# Patient Record
Sex: FEMALE | Race: WHITE
[De-identification: names, ages, dates, MRNs, and addresses within clinical notes are randomized per-mention and may not be internally consistent; named-entity substitution may affect disease eponyms.]

---

## 2020-02-06 ENCOUNTER — HOSPITAL ENCOUNTER (OUTPATIENT)
Dept: HOSPITAL 26 - MED | Age: 60
Setting detail: OBSERVATION
LOS: 1 days | Discharge: HOME | End: 2020-02-07
Attending: INTERNAL MEDICINE | Admitting: INTERNAL MEDICINE
Payer: COMMERCIAL

## 2020-02-06 VITALS — DIASTOLIC BLOOD PRESSURE: 80 MMHG | SYSTOLIC BLOOD PRESSURE: 159 MMHG

## 2020-02-06 VITALS — BODY MASS INDEX: 48.82 KG/M2 | HEIGHT: 65 IN | WEIGHT: 293 LBS

## 2020-02-06 VITALS — DIASTOLIC BLOOD PRESSURE: 94 MMHG | SYSTOLIC BLOOD PRESSURE: 157 MMHG

## 2020-02-06 VITALS — DIASTOLIC BLOOD PRESSURE: 76 MMHG | SYSTOLIC BLOOD PRESSURE: 148 MMHG

## 2020-02-06 DIAGNOSIS — Z79.899: ICD-10-CM

## 2020-02-06 DIAGNOSIS — E66.9: ICD-10-CM

## 2020-02-06 DIAGNOSIS — E03.9: ICD-10-CM

## 2020-02-06 DIAGNOSIS — F17.210: ICD-10-CM

## 2020-02-06 DIAGNOSIS — E11.9: ICD-10-CM

## 2020-02-06 DIAGNOSIS — F41.9: ICD-10-CM

## 2020-02-06 DIAGNOSIS — J44.1: Primary | ICD-10-CM

## 2020-02-06 DIAGNOSIS — F32.9: ICD-10-CM

## 2020-02-06 DIAGNOSIS — Z88.0: ICD-10-CM

## 2020-02-06 DIAGNOSIS — I10: ICD-10-CM

## 2020-02-06 LAB
ALBUMIN FLD-MCNC: 3.2 G/DL (ref 3.4–5)
ANION GAP SERPL CALCULATED.3IONS-SCNC: 11.2 MMOL/L (ref 8–16)
AST SERPL-CCNC: 30 U/L (ref 15–37)
BASOPHILS # BLD AUTO: 0.1 K/UL (ref 0–0.22)
BASOPHILS NFR BLD AUTO: 1.2 % (ref 0–2)
BILIRUB SERPL-MCNC: 0.4 MG/DL (ref 0–1)
BUN SERPL-MCNC: 7 MG/DL (ref 7–18)
CHLORIDE SERPL-SCNC: 105 MMOL/L (ref 98–107)
CO2 SERPL-SCNC: 27.8 MMOL/L (ref 21–32)
CREAT SERPL-MCNC: 0.7 MG/DL (ref 0.6–1.3)
EOSINOPHIL # BLD AUTO: 0.1 K/UL (ref 0–0.4)
EOSINOPHIL NFR BLD AUTO: 1.6 % (ref 0–4)
ERYTHROCYTE [DISTWIDTH] IN BLOOD BY AUTOMATED COUNT: 14.6 % (ref 11.6–13.7)
GFR SERPL CREATININE-BSD FRML MDRD: 111 ML/MIN (ref 90–?)
GLUCOSE SERPL-MCNC: 123 MG/DL (ref 74–106)
HCT VFR BLD AUTO: 44 % (ref 36–48)
HGB BLD-MCNC: 14.5 G/DL (ref 12–16)
LYMPHOCYTES # BLD AUTO: 2.3 K/UL (ref 2.5–16.5)
LYMPHOCYTES NFR BLD AUTO: 29.3 % (ref 20.5–51.1)
MCH RBC QN AUTO: 29 PG (ref 27–31)
MCHC RBC AUTO-ENTMCNC: 33 G/DL (ref 33–37)
MCV RBC AUTO: 87.3 FL (ref 80–94)
MONOCYTES # BLD AUTO: 0.8 K/UL (ref 0.8–1)
MONOCYTES NFR BLD AUTO: 10.7 % (ref 1.7–9.3)
NEUTROPHILS # BLD AUTO: 4.4 K/UL (ref 1.8–7.7)
NEUTROPHILS NFR BLD AUTO: 57.2 % (ref 42.2–75.2)
PLATELET # BLD AUTO: 220 K/UL (ref 140–450)
POTASSIUM SERPL-SCNC: 4 MMOL/L (ref 3.5–5.1)
RBC # BLD AUTO: 5.04 MIL/UL (ref 4.2–5.4)
RSV AG SPEC QL IA: NEGATIVE
SODIUM SERPL-SCNC: 140 MMOL/L (ref 136–145)
WBC # BLD AUTO: 7.7 K/UL (ref 4.8–10.8)

## 2020-02-06 PROCEDURE — 96372 THER/PROPH/DIAG INJ SC/IM: CPT

## 2020-02-06 PROCEDURE — 87040 BLOOD CULTURE FOR BACTERIA: CPT

## 2020-02-06 PROCEDURE — 71045 X-RAY EXAM CHEST 1 VIEW: CPT

## 2020-02-06 PROCEDURE — 96365 THER/PROPH/DIAG IV INF INIT: CPT

## 2020-02-06 PROCEDURE — 87081 CULTURE SCREEN ONLY: CPT

## 2020-02-06 PROCEDURE — 82948 REAGENT STRIP/BLOOD GLUCOSE: CPT

## 2020-02-06 PROCEDURE — 83605 ASSAY OF LACTIC ACID: CPT

## 2020-02-06 PROCEDURE — 94640 AIRWAY INHALATION TREATMENT: CPT

## 2020-02-06 PROCEDURE — 94760 N-INVAS EAR/PLS OXIMETRY 1: CPT

## 2020-02-06 PROCEDURE — 80053 COMPREHEN METABOLIC PANEL: CPT

## 2020-02-06 PROCEDURE — 99285 EMERGENCY DEPT VISIT HI MDM: CPT

## 2020-02-06 PROCEDURE — 96376 TX/PRO/DX INJ SAME DRUG ADON: CPT

## 2020-02-06 PROCEDURE — 36415 COLL VENOUS BLD VENIPUNCTURE: CPT

## 2020-02-06 PROCEDURE — 96375 TX/PRO/DX INJ NEW DRUG ADDON: CPT

## 2020-02-06 PROCEDURE — G0378 HOSPITAL OBSERVATION PER HR: HCPCS

## 2020-02-06 PROCEDURE — 85025 COMPLETE CBC W/AUTO DIFF WBC: CPT

## 2020-02-06 PROCEDURE — 84484 ASSAY OF TROPONIN QUANT: CPT

## 2020-02-06 PROCEDURE — 80048 BASIC METABOLIC PNL TOTAL CA: CPT

## 2020-02-06 PROCEDURE — 87804 INFLUENZA ASSAY W/OPTIC: CPT

## 2020-02-06 PROCEDURE — 87420 RESP SYNCYTIAL VIRUS AG IA: CPT

## 2020-02-06 RX ADMIN — WATER SCH MG: 1 INJECTION INTRAMUSCULAR; INTRAVENOUS; SUBCUTANEOUS at 20:32

## 2020-02-06 RX ADMIN — ACETAMINOPHEN PRN MG: 325 TABLET ORAL at 18:01

## 2020-02-06 RX ADMIN — SODIUM CHLORIDE SCH MLS/HR: 9 INJECTION, SOLUTION INTRAVENOUS at 16:53

## 2020-02-06 RX ADMIN — SODIUM CHLORIDE PRN MG: 9 INJECTION, SOLUTION INTRAVENOUS at 20:47

## 2020-02-06 RX ADMIN — INSULIN LISPRO PRN UNITS: 100 INJECTION, SOLUTION INTRAVENOUS; SUBCUTANEOUS at 22:10

## 2020-02-06 NOTE — NUR
Patient will be admitted to care of DR KEENAN. Admited to MED SURG.  Will go to 
[id number]. Belongings list completed.  Report to BARBI YU.

## 2020-02-06 NOTE — NUR
PT WAS NOTED COUGHING AND RT WAS CALLED FOR PRN NEB TREATMENT. CALLED ON CALL MD NAGEL FOR 
REQUEST OF SLEEPING PILL. HE SAID YES AMBIEN 5MG. MD  DECLINED TO GIVEN PRN COUGH 
MEDICATION.

## 2020-02-06 NOTE — NUR
PATIENT IS SATURATING 90% ROOM AIR AT THIS TIME. NO SIGNS OF DISTRESS NOTED. NO PAIN. NO 
CHEST PAIN.

## 2020-02-06 NOTE — NUR
PT GIVEN ORDERED SOLUMEDROL; EDUCATION REGARDING MEDICATION PURPOSE AND SIDE EFFECTS 
EXPLAINED AT BEDSIDE. PT WAS ASKING ABOUT IF SHE IS ABLE TO CONTINUE WITH HER HOME 
MEDICATIONS. PT FINGERSTICK . WILL CALL ON CALL MD TO RECONCILE HOME MEDS.

## 2020-02-06 NOTE — NUR
GIVEN TYLENOL FOR MILD PAIN. EXPLAINED TO PATIENT MED. PATIENT VERBALIZED UNDERSTANDING. BED 
IN LOW POSITION. CALL LIGHT IS WITHIN REACH. WILL CONTINUE TO MONITOR

## 2020-02-06 NOTE — NUR
PATIENT IS AWAKE, LAYING IN BED. NO SIGNS OF DISTRESS. NO SOB. NO CHEST PAIN OR ANY 
DISCOMFORT. BED IN LOW POSITION. CALL LIGHT IS WITHIN REACH. WILL CONTINUE TO MONITOR

## 2020-02-06 NOTE — NUR
RECEIVED PATIENT FROM ED NURSE LINH VIA DEQUAN. AAOX4. RESPIRATIONS EVEN AND LABORED, RESP 
26. PATIENT IS DIAGNOSED WITH COPD. PATIENT HAS INTERMITTENT NON-PRODUCTIVE COUGH. NO FEVER. 
NO PAIN. ON ROOM AIR. AS PER ED NURSE, KEEP PATIENT ON ROOM AIR TO SEE HOW WELL PATIENT IS 
TOLERATING IT. SKIN WARM, DRY, AND INTACT. IV IN THE RIGHT AC G20 RUNNING AZITHROMYCIN AT 
150 ML/HR. ABDOMEN ROUND AND NON-TENDER. LAST BM WAS 2/2/2020. LAST MENSTRUAL PERIOD WAS 5 
YEARS AGO. PATIENT AMBULATED TO THE ROOM. BED IN LOW POSITION. CALL LIGHT IS WITHIN REACH. 
WILL CONTINUE TO MONITOR.

## 2020-02-06 NOTE — NUR
ENDORSED PATIENT TO NIGHT SHIFT NURSE FOR CONTINUITY OF CARE. NO SOB. DENIES ANY OTHER 
DISCOMFORT. PATIENT IS IN STABLE CONDITION

## 2020-02-06 NOTE — NUR
HOME MEDS RECONCILED WITH ON CALL MD NAGEL. HE DIDN'T WANT TO CONTINUE PT ON METFORMIN, 
HOWEVER HE DID ORDERED PROTOCOL F/S AND INSULIN S/S. PT WAS GIVEN 4 UNITS OF HUMALOG 
COVERAGE. MD NAGEL ALSO SAID OK TO REQUESTED PSYCH  MEDS OF SEROQUEL, ZOLOFT, ATIVAN AND 
LATUDA AS ORDERED FOR HOME MEDS.MD DID NOT WANT TO CONTINUE WITH HYDROCHLOROTHIAZIDE AT THIS 
TIME.

## 2020-02-06 NOTE — NUR
HHN THERAPY AND RESPIRATORY DRUG GIVEN AS ORDERED ENCOURAGED PATIENT FOR 
INTERMITTENT DEEP BREATHING DURING THERAPY

## 2020-02-06 NOTE — NUR
PT SITTING UPRIGHT CALM AND PLEASANT. STATES EASIER WORK OF BREATHING WITH 
OXYGEN. WILL CONTINUE TO MONITOR

## 2020-02-06 NOTE — NUR
HANG NS AT 50 ML/HR. AZITHROMYCIN IS RUNNING  ML/HR AS PER ED NURSE

-------------------------------------------------------------------------------

Addendum: 02/06/20 at 1831 by Princess Vivien Renee RN

-------------------------------------------------------------------------------

NS AT 70 ML/HR

## 2020-02-06 NOTE — NUR
PT IN BED AOX 3-4, WITH SKIN INTACT. IV SITE INTACT RAC 22G RUNNING NORMAL SALINE AT 
70MLS/HR. PT HAS 2 LITER S VIA N/C. RESPIRATIONS SOMEWHAT LABORED AND SHE IS NOTED TO HAVE A 
DRY NON  PRODUCTIVE  INTERMITTED COUGH. V/S AS FOLLOWS: T 97.0 P 77 R 20 B/P 159/80 O2 93% 
WITH 2 LITERS VIA N/C. ALL UNIVERSAL FALLS PRECAUTIONS IN PLACE.

## 2020-02-06 NOTE — NUR
RECEIVED REPORT FORM GIGI RN DAYSHIFT NURSE AT BEDSIDE FOR CONTINUITY OF CARE, PT IN 
STABLE CONDITION.

## 2020-02-06 NOTE — NUR
SWAB FOR MRSA NARES COLLECTED. VITAL SIGNS TAKEN. BP /76, HR IS 78, O2SAT IS 90% ROOM 
AIR. TEMP IS 97.8. RESPIRATIONS IS 26, LABORED.

## 2020-02-06 NOTE — NUR
FOLLOW UP HHN THERAPY AND RESPIRATORY DRUG GIVEN AS NEEDED ENCOURAGED PATIENT 
FOR INTERMITTENT DEEP BREATHING DURING THERAPY

## 2020-02-07 VITALS — SYSTOLIC BLOOD PRESSURE: 125 MMHG | DIASTOLIC BLOOD PRESSURE: 90 MMHG

## 2020-02-07 VITALS — DIASTOLIC BLOOD PRESSURE: 74 MMHG | SYSTOLIC BLOOD PRESSURE: 160 MMHG

## 2020-02-07 LAB
ANION GAP SERPL CALCULATED.3IONS-SCNC: 11 MMOL/L (ref 8–16)
BASOPHILS # BLD AUTO: 0 K/UL (ref 0–0.22)
BASOPHILS NFR BLD AUTO: 0.4 % (ref 0–2)
BUN SERPL-MCNC: 9 MG/DL (ref 7–18)
CHLORIDE SERPL-SCNC: 103 MMOL/L (ref 98–107)
CO2 SERPL-SCNC: 27.3 MMOL/L (ref 21–32)
CREAT SERPL-MCNC: 0.7 MG/DL (ref 0.6–1.3)
EOSINOPHIL # BLD AUTO: 0.1 K/UL (ref 0–0.4)
EOSINOPHIL NFR BLD AUTO: 0.8 % (ref 0–4)
ERYTHROCYTE [DISTWIDTH] IN BLOOD BY AUTOMATED COUNT: 14.5 % (ref 11.6–13.7)
GFR SERPL CREATININE-BSD FRML MDRD: 111 ML/MIN (ref 90–?)
GLUCOSE SERPL-MCNC: 202 MG/DL (ref 74–106)
HCT VFR BLD AUTO: 42.7 % (ref 36–48)
HGB BLD-MCNC: 14.5 G/DL (ref 12–16)
LYMPHOCYTES # BLD AUTO: 1.1 K/UL (ref 2.5–16.5)
LYMPHOCYTES NFR BLD AUTO: 10.5 % (ref 20.5–51.1)
MCH RBC QN AUTO: 30 PG (ref 27–31)
MCHC RBC AUTO-ENTMCNC: 34 G/DL (ref 33–37)
MCV RBC AUTO: 87.3 FL (ref 80–94)
MONOCYTES # BLD AUTO: 0.2 K/UL (ref 0.8–1)
MONOCYTES NFR BLD AUTO: 2.3 % (ref 1.7–9.3)
NEUTROPHILS # BLD AUTO: 9.2 K/UL (ref 1.8–7.7)
NEUTROPHILS NFR BLD AUTO: 86 % (ref 42.2–75.2)
PLATELET # BLD AUTO: 225 K/UL (ref 140–450)
POTASSIUM SERPL-SCNC: 4.3 MMOL/L (ref 3.5–5.1)
RBC # BLD AUTO: 4.89 MIL/UL (ref 4.2–5.4)
SODIUM SERPL-SCNC: 137 MMOL/L (ref 136–145)
WBC # BLD AUTO: 10.6 K/UL (ref 4.8–10.8)

## 2020-02-07 RX ADMIN — WATER SCH MG: 1 INJECTION INTRAMUSCULAR; INTRAVENOUS; SUBCUTANEOUS at 13:04

## 2020-02-07 RX ADMIN — SODIUM CHLORIDE PRN MG: 9 INJECTION, SOLUTION INTRAVENOUS at 11:59

## 2020-02-07 RX ADMIN — Medication SCH DEV: at 06:07

## 2020-02-07 RX ADMIN — INSULIN LISPRO PRN UNITS: 100 INJECTION, SOLUTION INTRAVENOUS; SUBCUTANEOUS at 11:58

## 2020-02-07 RX ADMIN — ALBUTEROL SULFATE PRN MG: 2.5 SOLUTION RESPIRATORY (INHALATION) at 05:22

## 2020-02-07 RX ADMIN — IPRATROPIUM BROMIDE PRN MG: 0.5 SOLUTION RESPIRATORY (INHALATION) at 00:05

## 2020-02-07 RX ADMIN — ACETAMINOPHEN PRN MG: 325 TABLET ORAL at 05:09

## 2020-02-07 RX ADMIN — ACETAMINOPHEN PRN MG: 325 TABLET ORAL at 08:49

## 2020-02-07 RX ADMIN — IPRATROPIUM BROMIDE PRN MG: 0.5 SOLUTION RESPIRATORY (INHALATION) at 05:22

## 2020-02-07 RX ADMIN — SODIUM CHLORIDE SCH MLS/HR: 9 INJECTION, SOLUTION INTRAVENOUS at 06:13

## 2020-02-07 RX ADMIN — INSULIN LISPRO PRN UNITS: 100 INJECTION, SOLUTION INTRAVENOUS; SUBCUTANEOUS at 06:10

## 2020-02-07 RX ADMIN — Medication SCH DEV: at 11:57

## 2020-02-07 RX ADMIN — SODIUM CHLORIDE PRN MG: 9 INJECTION, SOLUTION INTRAVENOUS at 05:06

## 2020-02-07 RX ADMIN — WATER SCH MG: 1 INJECTION INTRAMUSCULAR; INTRAVENOUS; SUBCUTANEOUS at 04:55

## 2020-02-07 RX ADMIN — ALBUTEROL SULFATE PRN MG: 2.5 SOLUTION RESPIRATORY (INHALATION) at 00:05

## 2020-02-07 RX ADMIN — ALBUTEROL SULFATE PRN MG: 2.5 SOLUTION RESPIRATORY (INHALATION) at 13:18
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
## 2020-02-07 NOTE — NUR
WENT OVER DC INSTRUCTIONS WITH PT. PT SIGNED APPROPRIATE DOCUMENTS. INSTRUCTED PT TO VISIT 
ED FOR ANY SIGNS OF DISTRESS. PT VERBALIZED UNDERSTANDING. INTACT IV CANNULA AND ID BANDS, 
AND ALLERGY BAND REMOVED. PT CHANGED INTO OWN CLOTHING AND GATHERED THEIR BELONGINGS. PT 
REFUSED FLU VACCINE AND DID NOT QUALIFY FOR PNA VACCINE. PT WHEELED TO PRIVATE VEHICLE TO 
RETURN HOME. PT IS STABLE.

## 2020-02-07 NOTE — NUR
Note:



Basic Screen: 

Yes

High Risk DC Screen 

No

 Name: 

CHRISTIANO BUTLER

Home Tel: 

[phone]

Relationship: 

CHILD

Pre-Admission Living Arrangements: 

Lives with Other

Prior ADL 

 Independent

Current Home Health Name/Tel: 

N/A

Current DME/02 Name/Tel: 

N/A

Current Hospice Name/Tel: 

N/A

Current Dialysis Name/Tel: 

N/A

Healthcare Decision Maker: 

Patient

Advance Directive 

No

Physician Orders for Life Sustaining Treatment Form 

No

Patient/Family Have Educational Needs 

No

Information Taught: 

 Advance Directive

Community Resources

Person Taught: 

Patient

Teaching Tools: 

Verbal

Factors Affecting Learning: 

None

Participation Level: 

Refused

Evaluation: 

Verbalizes Understanding

Needs Additional Education: 

No

Discipline: 

Case Mgt/Social Svcs

Tentative Discharge Plan/Destination: 

DME

Will require assistance post discharge: 

No

 Referred to : 

No

Tentative Discharge Plan Summary: 

Patient is a 59-year-old female who was admitted for COPD exacerbation. 

Patient has PMHX of COPD diabetes, hypertension, and thyroid disease. 

Patient was admitted from home where she lives with her two sons. SW met 

with patient at bedside. Patient stated that her emergency contact for her 

son Christiano Butler was incorrect. Patient provided correct phone number: 

[phone]. Patient stated that she has a history of mental health 

disorders including Bipolar Disorder, Manic Depressive Disorder, and 

Severe Anxiety Disorder. Patient reports no history of substance abuse. SW 

offered mental health resources to patient, but patient stated she is 

already arranging her mental heatlh services and refused. Patient's 

tentative discharge plan is to return home. No further needs identified.

 Signature: 

GOLDEN Davidson

Date: 

Feb 7, 2020

Time: 

13:56

## 2020-02-07 NOTE — NUR
PT GIVEN PO/PRN AMBIEN TO HELP WITH SLEEP. V/S AS FOLLOWS: T 97.6 P 91 R 20 B/P 125/90 02 
93% WITH 2 LITERS VIA N/C. ALL UNIVERSAL FALLS PRECAUTIONS IN PLACE.

## 2020-02-07 NOTE — NUR
NEW IV SITE INSERTED IN RIGHT FA 22G IS CLEAN, DRY, AND INTACT. SAFETY MEASURES IN PLACE. 
WILL CONTINUE TO MONITOR.

## 2020-02-07 NOTE — NUR
IV SITE ON R HAND 22G WAS PULLED OUT. NEW IV SITE PROVIDED 24G ON LEFT HAND PT GIVEN ORDERED 
SOLUMEDROL.

## 2020-02-07 NOTE — NUR
RECEIVED BEDSIDE REPORT FROM NIGHTSHIFT NURSE. PT RESTING IN BED UPON ARRIVAL. ABLE TO MAKE 
NEEDS KNOWN. RESPIRATIONS EVEN AND UNLABORED WITH NO SOB OR RESPIRATORY DISTRESS. SKIN WARM 
AND DRY TO TOUCH. IV SITE IN LEFT HAND 24G IS CLEAN, DRY, AND INTACT. SAFETY MEASURES IN 
PLACE. WILL CONTINUE TO MONITOR.

## 2020-02-07 NOTE — NUR
PATIENT HAS BEEN SCREENED AND CATEGORIZED AS MODERATE NUTRITION RISK. PATIENT WILL BE SEEN 
WITHIN 3-5 DAYS OF ADMISSION.



02/09/20 02/11/20



[address]IBERTO [address]

## 2020-02-07 NOTE — NUR
PT IS AWARE OF DISCHARGE AND WOULD LIKE TO LEAVE AROUND 1530. SON RICARDO WAS CALLED AND SAID 
HE WOULD BE HERE SHORTLY. SAFETY MEASURES IN PLACE. WILL CONTINUE TO MONITOR.

## 2020-02-07 NOTE — NUR
PT BLOOD SUGAR . ADMINISTERED SCHED MED AS PRESCRIBED PER MD ORDER. PT TOLERATED WELL. 
MEDICATION EDUCATION PERFORMED. PT VERBALIZED UNDERSTANDING. SAFETY MEASURES IN PLACE. WILL 
CONTINUE TO MONITOR

## 2020-02-07 NOTE — NUR
SASHA FROM THE PHARMACY CALLED REGARDING LATUDA, [facility] DOESN'T HAVE LATUDA 
OM HAND. WILL ENDORSE TO NEXT SHIFT TO ASK FAMILY TO BRING IN FOR HER.

## 2020-03-11 ENCOUNTER — HOSPITAL ENCOUNTER (EMERGENCY)
Dept: HOSPITAL 26 - MED | Age: 60
Discharge: HOME | End: 2020-03-11
Payer: COMMERCIAL

## 2020-03-11 VITALS — DIASTOLIC BLOOD PRESSURE: 59 MMHG | SYSTOLIC BLOOD PRESSURE: 111 MMHG

## 2020-03-11 VITALS — BODY MASS INDEX: 46.65 KG/M2 | HEIGHT: 65 IN | WEIGHT: 280 LBS

## 2020-03-11 DIAGNOSIS — F17.200: ICD-10-CM

## 2020-03-11 DIAGNOSIS — I10: ICD-10-CM

## 2020-03-11 DIAGNOSIS — Z98.890: ICD-10-CM
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
## 2020-03-11 DIAGNOSIS — B34.9: ICD-10-CM

## 2020-03-11 DIAGNOSIS — Z88.0: ICD-10-CM

## 2020-03-11 DIAGNOSIS — S86.812A: Primary | ICD-10-CM
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
## 2020-03-11 DIAGNOSIS — N12: ICD-10-CM

## 2020-03-11 DIAGNOSIS — J44.9: ICD-10-CM

## 2020-03-11 DIAGNOSIS — E07.89: ICD-10-CM

## 2020-03-11 DIAGNOSIS — E11.9: ICD-10-CM

## 2020-03-11 DIAGNOSIS — Z79.899: ICD-10-CM

## 2020-03-11 LAB
ALBUMIN FLD-MCNC: 3.4 G/DL (ref 3.4–5)
ANION GAP SERPL CALCULATED.3IONS-SCNC: 8 MMOL/L (ref 8–16)
APPEARANCE UR: (no result)
AST SERPL-CCNC: 18 U/L (ref 15–37)
BASOPHILS # BLD AUTO: 0.1 K/UL (ref 0–0.22)
BASOPHILS NFR BLD AUTO: 0.9 % (ref 0–2)
BILIRUB SERPL-MCNC: 0.3 MG/DL (ref 0–1)
BILIRUB UR QL STRIP: NEGATIVE
BUN SERPL-MCNC: 10 MG/DL (ref 7–18)
CHLORIDE SERPL-SCNC: 103 MMOL/L (ref 98–107)
CO2 SERPL-SCNC: 34.9 MMOL/L (ref 21–32)
COLOR UR: YELLOW
CREAT SERPL-MCNC: 1.2 MG/DL (ref 0.6–1.3)
EOSINOPHIL # BLD AUTO: 0.1 K/UL (ref 0–0.4)
EOSINOPHIL NFR BLD AUTO: 1.4 % (ref 0–4)
ERYTHROCYTE [DISTWIDTH] IN BLOOD BY AUTOMATED COUNT: 15.3 % (ref 11.6–13.7)
GFR SERPL CREATININE-BSD FRML MDRD: 60 ML/MIN (ref 90–?)
GLUCOSE SERPL-MCNC: 108 MG/DL (ref 74–106)
GLUCOSE UR STRIP-MCNC: NEGATIVE MG/DL
HCT VFR BLD AUTO: 42.6 % (ref 36–48)
HGB BLD-MCNC: 14 G/DL (ref 12–16)
HGB UR QL STRIP: NEGATIVE
LEUKOCYTE ESTERASE UR QL STRIP: NEGATIVE
LYMPHOCYTES # BLD AUTO: 2.9 K/UL (ref 2.5–16.5)
LYMPHOCYTES NFR BLD AUTO: 27.7 % (ref 20.5–51.1)
MCH RBC QN AUTO: 29 PG (ref 27–31)
MCHC RBC AUTO-ENTMCNC: 33 G/DL (ref 33–37)
MCV RBC AUTO: 86.6 FL (ref 80–94)
MONOCYTES # BLD AUTO: 0.9 K/UL (ref 0.8–1)
MONOCYTES NFR BLD AUTO: 8.5 % (ref 1.7–9.3)
NEUTROPHILS # BLD AUTO: 6.4 K/UL (ref 1.8–7.7)
NEUTROPHILS NFR BLD AUTO: 61.5 % (ref 42.2–75.2)
NITRITE UR QL STRIP: NEGATIVE
PH UR STRIP: 8.5 [PH] (ref 5–9)
PLATELET # BLD AUTO: 235 K/UL (ref 140–450)
POTASSIUM SERPL-SCNC: 3.9 MMOL/L (ref 3.5–5.1)
RBC # BLD AUTO: 4.92 MIL/UL (ref 4.2–5.4)
SODIUM SERPL-SCNC: 142 MMOL/L (ref 136–145)
WBC # BLD AUTO: 10.4 K/UL (ref 4.8–10.8)

## 2020-03-11 PROCEDURE — 96365 THER/PROPH/DIAG IV INF INIT: CPT

## 2020-03-11 PROCEDURE — 84484 ASSAY OF TROPONIN QUANT: CPT

## 2020-03-11 PROCEDURE — 87040 BLOOD CULTURE FOR BACTERIA: CPT

## 2020-03-11 PROCEDURE — 73590 X-RAY EXAM OF LOWER LEG: CPT

## 2020-03-11 PROCEDURE — 83605 ASSAY OF LACTIC ACID: CPT

## 2020-03-11 PROCEDURE — 81003 URINALYSIS AUTO W/O SCOPE: CPT

## 2020-03-11 PROCEDURE — 96375 TX/PRO/DX INJ NEW DRUG ADDON: CPT

## 2020-03-11 PROCEDURE — 85025 COMPLETE CBC W/AUTO DIFF WBC: CPT

## 2020-03-11 PROCEDURE — 93005 ELECTROCARDIOGRAM TRACING: CPT

## 2020-03-11 PROCEDURE — 94640 AIRWAY INHALATION TREATMENT: CPT

## 2020-03-11 PROCEDURE — 99285 EMERGENCY DEPT VISIT HI MDM: CPT

## 2020-03-11 PROCEDURE — 80053 COMPREHEN METABOLIC PANEL: CPT

## 2020-03-11 PROCEDURE — 36415 COLL VENOUS BLD VENIPUNCTURE: CPT

## 2020-03-11 PROCEDURE — 71045 X-RAY EXAM CHEST 1 VIEW: CPT

## 2020-03-11 PROCEDURE — 87086 URINE CULTURE/COLONY COUNT: CPT
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
## 2020-03-11 NOTE — NUR
Patient discharged with v/s stable. Written and verbal after care instructions 
given and explained. 

Patient alert, oriented and verbalized understanding of instructions. 
Ambulatory with steady gait. All questions addressed prior to discharge. ID 
band removed. Patient advised to follow up with PMD. Rx of PREDNISONE, 
LEVAQUIN, ALBUTEROL, AND AEROCHAMBER given. Patient educated on indication of 
medication including possible reaction and side effects. Opportunity to ask 
questions provided and answered.

## 2020-03-13 ENCOUNTER — HOSPITAL ENCOUNTER (INPATIENT)
Dept: HOSPITAL 26 - MED | Age: 60
LOS: 3 days | Discharge: HOME | DRG: 133 | End: 2020-03-16
Attending: INTERNAL MEDICINE | Admitting: INTERNAL MEDICINE
Payer: COMMERCIAL

## 2020-03-13 VITALS — DIASTOLIC BLOOD PRESSURE: 77 MMHG | SYSTOLIC BLOOD PRESSURE: 135 MMHG

## 2020-03-13 VITALS — SYSTOLIC BLOOD PRESSURE: 142 MMHG | DIASTOLIC BLOOD PRESSURE: 71 MMHG

## 2020-03-13 VITALS — SYSTOLIC BLOOD PRESSURE: 122 MMHG | DIASTOLIC BLOOD PRESSURE: 85 MMHG

## 2020-03-13 VITALS — HEIGHT: 65 IN | WEIGHT: 281 LBS | BODY MASS INDEX: 46.82 KG/M2

## 2020-03-13 DIAGNOSIS — G47.33: ICD-10-CM

## 2020-03-13 DIAGNOSIS — J96.21: Primary | ICD-10-CM

## 2020-03-13 DIAGNOSIS — Z88.0: ICD-10-CM

## 2020-03-13 DIAGNOSIS — Z23: ICD-10-CM

## 2020-03-13 DIAGNOSIS — J44.1: ICD-10-CM

## 2020-03-13 DIAGNOSIS — F17.210: ICD-10-CM

## 2020-03-13 DIAGNOSIS — R65.10: ICD-10-CM

## 2020-03-13 DIAGNOSIS — E03.9: ICD-10-CM

## 2020-03-13 DIAGNOSIS — E66.01: ICD-10-CM

## 2020-03-13 DIAGNOSIS — E11.22: ICD-10-CM

## 2020-03-13 DIAGNOSIS — I12.9: ICD-10-CM

## 2020-03-13 DIAGNOSIS — E78.5: ICD-10-CM

## 2020-03-13 DIAGNOSIS — Z98.891: ICD-10-CM

## 2020-03-13 DIAGNOSIS — N18.1: ICD-10-CM

## 2020-03-13 LAB
ALBUMIN FLD-MCNC: 3.6 G/DL (ref 3.4–5)
ANION GAP SERPL CALCULATED.3IONS-SCNC: 10.8 MMOL/L (ref 8–16)
AST SERPL-CCNC: 22 U/L (ref 15–37)
BASOPHILS # BLD AUTO: 0.1 K/UL (ref 0–0.22)
BASOPHILS NFR BLD AUTO: 0.5 % (ref 0–2)
BILIRUB SERPL-MCNC: 0.5 MG/DL (ref 0–1)
BUN SERPL-MCNC: 14 MG/DL (ref 7–18)
CHLORIDE SERPL-SCNC: 103 MMOL/L (ref 98–107)
CO2 SERPL-SCNC: 30.8 MMOL/L (ref 21–32)
CREAT SERPL-MCNC: 0.9 MG/DL (ref 0.6–1.3)
EOSINOPHIL # BLD AUTO: 0 K/UL (ref 0–0.4)
EOSINOPHIL NFR BLD AUTO: 0.3 % (ref 0–4)
ERYTHROCYTE [DISTWIDTH] IN BLOOD BY AUTOMATED COUNT: 15.6 % (ref 11.6–13.7)
GFR SERPL CREATININE-BSD FRML MDRD: 83 ML/MIN (ref 90–?)
GLUCOSE SERPL-MCNC: 172 MG/DL (ref 74–106)
HCT VFR BLD AUTO: 42.9 % (ref 36–48)
HGB BLD-MCNC: 13.9 G/DL (ref 12–16)
LYMPHOCYTES # BLD AUTO: 1.4 K/UL (ref 2.5–16.5)
LYMPHOCYTES NFR BLD AUTO: 11.3 % (ref 20.5–51.1)
MCH RBC QN AUTO: 28 PG (ref 27–31)
MCHC RBC AUTO-ENTMCNC: 32 G/DL (ref 33–37)
MCV RBC AUTO: 86.7 FL (ref 80–94)
MONOCYTES # BLD AUTO: 0.6 K/UL (ref 0.8–1)
MONOCYTES NFR BLD AUTO: 4.7 % (ref 1.7–9.3)
NEUTROPHILS # BLD AUTO: 10.1 K/UL (ref 1.8–7.7)
NEUTROPHILS NFR BLD AUTO: 83.2 % (ref 42.2–75.2)
PLATELET # BLD AUTO: 245 K/UL (ref 140–450)
POTASSIUM SERPL-SCNC: 4.6 MMOL/L (ref 3.5–5.1)
PROTHROMBIN TIME: 9.3 SECS (ref 10.8–13.4)
RBC # BLD AUTO: 4.95 MIL/UL (ref 4.2–5.4)
SODIUM SERPL-SCNC: 140 MMOL/L (ref 136–145)
WBC # BLD AUTO: 12.1 K/UL (ref 4.8–10.8)

## 2020-03-13 PROCEDURE — 99285 EMERGENCY DEPT VISIT HI MDM: CPT

## 2020-03-13 PROCEDURE — 96361 HYDRATE IV INFUSION ADD-ON: CPT

## 2020-03-13 PROCEDURE — G0378 HOSPITAL OBSERVATION PER HR: HCPCS

## 2020-03-13 PROCEDURE — 96375 TX/PRO/DX INJ NEW DRUG ADDON: CPT

## 2020-03-13 PROCEDURE — 96365 THER/PROPH/DIAG IV INF INIT: CPT

## 2020-03-13 RX ADMIN — WATER SCH MG: 1 INJECTION INTRAMUSCULAR; INTRAVENOUS; SUBCUTANEOUS at 20:29

## 2020-03-13 RX ADMIN — INSULIN LISPRO PRN UNITS: 100 INJECTION, SOLUTION INTRAVENOUS; SUBCUTANEOUS at 17:27

## 2020-03-13 RX ADMIN — ZOLPIDEM TARTRATE SCH MG: 10 TABLET, FILM COATED ORAL at 21:16

## 2020-03-13 RX ADMIN — HYDROCODONE BITARTRATE AND ACETAMINOPHEN PRN TAB: 5; 325 TABLET ORAL at 17:08

## 2020-03-13 RX ADMIN — Medication SCH DEV: at 16:30

## 2020-03-13 RX ADMIN — SODIUM CHLORIDE PRN MG: 9 INJECTION, SOLUTION INTRAVENOUS at 20:18

## 2020-03-13 RX ADMIN — INSULIN LISPRO PRN UNITS: 100 INJECTION, SOLUTION INTRAVENOUS; SUBCUTANEOUS at 21:08

## 2020-03-13 RX ADMIN — Medication SCH DEV: at 20:34

## 2020-03-13 RX ADMIN — IPRATROPIUM BROMIDE AND ALBUTEROL SULFATE SCH ML: .5; 3 SOLUTION RESPIRATORY (INHALATION) at 19:26

## 2020-03-13 RX ADMIN — LEVOFLOXACIN SCH MLS/HR: 5 INJECTION, SOLUTION INTRAVENOUS at 17:10

## 2020-03-14 VITALS — SYSTOLIC BLOOD PRESSURE: 125 MMHG | DIASTOLIC BLOOD PRESSURE: 70 MMHG

## 2020-03-14 VITALS — SYSTOLIC BLOOD PRESSURE: 157 MMHG | DIASTOLIC BLOOD PRESSURE: 85 MMHG

## 2020-03-14 VITALS — DIASTOLIC BLOOD PRESSURE: 64 MMHG | SYSTOLIC BLOOD PRESSURE: 133 MMHG

## 2020-03-14 VITALS — SYSTOLIC BLOOD PRESSURE: 138 MMHG | DIASTOLIC BLOOD PRESSURE: 81 MMHG

## 2020-03-14 VITALS — DIASTOLIC BLOOD PRESSURE: 74 MMHG | SYSTOLIC BLOOD PRESSURE: 142 MMHG

## 2020-03-14 VITALS — SYSTOLIC BLOOD PRESSURE: 119 MMHG | DIASTOLIC BLOOD PRESSURE: 71 MMHG

## 2020-03-14 LAB
ANION GAP SERPL CALCULATED.3IONS-SCNC: 11.9 MMOL/L (ref 8–16)
BASOPHILS # BLD AUTO: 0.1 K/UL (ref 0–0.22)
BASOPHILS NFR BLD AUTO: 0.4 % (ref 0–2)
BUN SERPL-MCNC: 19 MG/DL (ref 7–18)
CHLORIDE SERPL-SCNC: 101 MMOL/L (ref 98–107)
CO2 SERPL-SCNC: 28.6 MMOL/L (ref 21–32)
CREAT SERPL-MCNC: 0.9 MG/DL (ref 0.6–1.3)
EOSINOPHIL # BLD AUTO: 0 K/UL (ref 0–0.4)
EOSINOPHIL NFR BLD AUTO: 0 % (ref 0–4)
ERYTHROCYTE [DISTWIDTH] IN BLOOD BY AUTOMATED COUNT: 15.3 % (ref 11.6–13.7)
GFR SERPL CREATININE-BSD FRML MDRD: 83 ML/MIN (ref 90–?)
GLUCOSE SERPL-MCNC: 244 MG/DL (ref 74–106)
HCT VFR BLD AUTO: 41.4 % (ref 36–48)
HGB BLD-MCNC: 13.4 G/DL (ref 12–16)
LYMPHOCYTES # BLD AUTO: 1.1 K/UL (ref 2.5–16.5)
LYMPHOCYTES NFR BLD AUTO: 8.1 % (ref 20.5–51.1)
MCH RBC QN AUTO: 28 PG (ref 27–31)
MCHC RBC AUTO-ENTMCNC: 32 G/DL (ref 33–37)
MCV RBC AUTO: 87 FL (ref 80–94)
MONOCYTES # BLD AUTO: 0.5 K/UL (ref 0.8–1)
MONOCYTES NFR BLD AUTO: 3.4 % (ref 1.7–9.3)
NEUTROPHILS # BLD AUTO: 12.1 K/UL (ref 1.8–7.7)
NEUTROPHILS NFR BLD AUTO: 88.1 % (ref 42.2–75.2)
PLATELET # BLD AUTO: 252 K/UL (ref 140–450)
POTASSIUM SERPL-SCNC: 4.5 MMOL/L (ref 3.5–5.1)
RBC # BLD AUTO: 4.76 MIL/UL (ref 4.2–5.4)
SODIUM SERPL-SCNC: 137 MMOL/L (ref 136–145)
WBC # BLD AUTO: 13.7 K/UL (ref 4.8–10.8)

## 2020-03-14 RX ADMIN — HYDROCODONE BITARTRATE AND ACETAMINOPHEN PRN TAB: 5; 325 TABLET ORAL at 23:41

## 2020-03-14 RX ADMIN — HYDROCODONE BITARTRATE AND ACETAMINOPHEN PRN TAB: 5; 325 TABLET ORAL at 16:51

## 2020-03-14 RX ADMIN — HYDROCODONE BITARTRATE AND ACETAMINOPHEN PRN TAB: 5; 325 TABLET ORAL at 08:34

## 2020-03-14 RX ADMIN — WATER SCH MG: 1 INJECTION INTRAMUSCULAR; INTRAVENOUS; SUBCUTANEOUS at 04:41

## 2020-03-14 RX ADMIN — SODIUM CHLORIDE PRN MG: 9 INJECTION, SOLUTION INTRAVENOUS at 08:16

## 2020-03-14 RX ADMIN — IPRATROPIUM BROMIDE AND ALBUTEROL SULFATE SCH ML: .5; 3 SOLUTION RESPIRATORY (INHALATION) at 23:02

## 2020-03-14 RX ADMIN — IPRATROPIUM BROMIDE AND ALBUTEROL SULFATE SCH ML: .5; 3 SOLUTION RESPIRATORY (INHALATION) at 06:07

## 2020-03-14 RX ADMIN — INSULIN LISPRO PRN UNITS: 100 INJECTION, SOLUTION INTRAVENOUS; SUBCUTANEOUS at 20:17

## 2020-03-14 RX ADMIN — Medication SCH DEV: at 16:12

## 2020-03-14 RX ADMIN — IPRATROPIUM BROMIDE AND ALBUTEROL SULFATE SCH ML: .5; 3 SOLUTION RESPIRATORY (INHALATION) at 19:07

## 2020-03-14 RX ADMIN — Medication SCH DEV: at 11:16

## 2020-03-14 RX ADMIN — INSULIN LISPRO PRN UNITS: 100 INJECTION, SOLUTION INTRAVENOUS; SUBCUTANEOUS at 16:17

## 2020-03-14 RX ADMIN — LEVOFLOXACIN SCH MLS/HR: 5 INJECTION, SOLUTION INTRAVENOUS at 17:03

## 2020-03-14 RX ADMIN — Medication SCH DEV: at 06:28

## 2020-03-14 RX ADMIN — IPRATROPIUM BROMIDE AND ALBUTEROL SULFATE SCH ML: .5; 3 SOLUTION RESPIRATORY (INHALATION) at 14:35

## 2020-03-14 RX ADMIN — Medication SCH DEV: at 20:19

## 2020-03-14 RX ADMIN — WATER SCH MG: 1 INJECTION INTRAMUSCULAR; INTRAVENOUS; SUBCUTANEOUS at 12:10

## 2020-03-14 RX ADMIN — WATER SCH MG: 1 INJECTION INTRAMUSCULAR; INTRAVENOUS; SUBCUTANEOUS at 23:23

## 2020-03-14 RX ADMIN — INSULIN LISPRO PRN UNITS: 100 INJECTION, SOLUTION INTRAVENOUS; SUBCUTANEOUS at 11:20

## 2020-03-14 RX ADMIN — Medication SCH MG: at 08:16

## 2020-03-14 RX ADMIN — SODIUM CHLORIDE PRN MG: 9 INJECTION, SOLUTION INTRAVENOUS at 16:51

## 2020-03-14 RX ADMIN — SODIUM CHLORIDE PRN MG: 9 INJECTION, SOLUTION INTRAVENOUS at 23:33

## 2020-03-14 RX ADMIN — HYDROCODONE BITARTRATE AND ACETAMINOPHEN PRN TAB: 5; 325 TABLET ORAL at 08:24

## 2020-03-14 RX ADMIN — ZOLPIDEM TARTRATE SCH MG: 10 TABLET, FILM COATED ORAL at 20:07

## 2020-03-14 RX ADMIN — ENOXAPARIN SODIUM SCH MG: 40 INJECTION SUBCUTANEOUS at 08:14

## 2020-03-14 RX ADMIN — WATER SCH MG: 1 INJECTION INTRAMUSCULAR; INTRAVENOUS; SUBCUTANEOUS at 17:03

## 2020-03-14 RX ADMIN — IPRATROPIUM BROMIDE AND ALBUTEROL SULFATE SCH ML: .5; 3 SOLUTION RESPIRATORY (INHALATION) at 01:04

## 2020-03-14 RX ADMIN — HYDROCODONE BITARTRATE AND ACETAMINOPHEN PRN TAB: 5; 325 TABLET ORAL at 01:17

## 2020-03-14 RX ADMIN — INSULIN LISPRO PRN UNITS: 100 INJECTION, SOLUTION INTRAVENOUS; SUBCUTANEOUS at 06:25

## 2020-03-15 VITALS — DIASTOLIC BLOOD PRESSURE: 85 MMHG | SYSTOLIC BLOOD PRESSURE: 137 MMHG

## 2020-03-15 VITALS — DIASTOLIC BLOOD PRESSURE: 50 MMHG | SYSTOLIC BLOOD PRESSURE: 116 MMHG

## 2020-03-15 VITALS — DIASTOLIC BLOOD PRESSURE: 65 MMHG | SYSTOLIC BLOOD PRESSURE: 119 MMHG

## 2020-03-15 VITALS — DIASTOLIC BLOOD PRESSURE: 61 MMHG | SYSTOLIC BLOOD PRESSURE: 114 MMHG

## 2020-03-15 VITALS — SYSTOLIC BLOOD PRESSURE: 151 MMHG | DIASTOLIC BLOOD PRESSURE: 64 MMHG

## 2020-03-15 VITALS — SYSTOLIC BLOOD PRESSURE: 138 MMHG | DIASTOLIC BLOOD PRESSURE: 78 MMHG

## 2020-03-15 LAB
ANION GAP SERPL CALCULATED.3IONS-SCNC: 12.1 MMOL/L (ref 8–16)
BASOPHILS # BLD AUTO: 0 K/UL (ref 0–0.22)
BASOPHILS NFR BLD AUTO: 0.2 % (ref 0–2)
BUN SERPL-MCNC: 20 MG/DL (ref 7–18)
CHLORIDE SERPL-SCNC: 100 MMOL/L (ref 98–107)
CO2 SERPL-SCNC: 29.7 MMOL/L (ref 21–32)
CREAT SERPL-MCNC: 0.9 MG/DL (ref 0.6–1.3)
EOSINOPHIL # BLD AUTO: 0 K/UL (ref 0–0.4)
EOSINOPHIL NFR BLD AUTO: 0 % (ref 0–4)
ERYTHROCYTE [DISTWIDTH] IN BLOOD BY AUTOMATED COUNT: 14.8 % (ref 11.6–13.7)
GFR SERPL CREATININE-BSD FRML MDRD: 83 ML/MIN (ref 90–?)
GLUCOSE SERPL-MCNC: 252 MG/DL (ref 74–106)
HCT VFR BLD AUTO: 41.8 % (ref 36–48)
HGB BLD-MCNC: 13.7 G/DL (ref 12–16)
LYMPHOCYTES # BLD AUTO: 1.1 K/UL (ref 2.5–16.5)
LYMPHOCYTES NFR BLD AUTO: 7.8 % (ref 20.5–51.1)
MCH RBC QN AUTO: 29 PG (ref 27–31)
MCHC RBC AUTO-ENTMCNC: 33 G/DL (ref 33–37)
MCV RBC AUTO: 87.1 FL (ref 80–94)
MONOCYTES # BLD AUTO: 0.7 K/UL (ref 0.8–1)
MONOCYTES NFR BLD AUTO: 5 % (ref 1.7–9.3)
NEUTROPHILS # BLD AUTO: 12.5 K/UL (ref 1.8–7.7)
NEUTROPHILS NFR BLD AUTO: 87 % (ref 42.2–75.2)
PLATELET # BLD AUTO: 265 K/UL (ref 140–450)
POTASSIUM SERPL-SCNC: 4.8 MMOL/L (ref 3.5–5.1)
RBC # BLD AUTO: 4.79 MIL/UL (ref 4.2–5.4)
SODIUM SERPL-SCNC: 137 MMOL/L (ref 136–145)
WBC # BLD AUTO: 14.4 K/UL (ref 4.8–10.8)

## 2020-03-15 RX ADMIN — IPRATROPIUM BROMIDE AND ALBUTEROL SULFATE SCH ML: .5; 3 SOLUTION RESPIRATORY (INHALATION) at 03:00

## 2020-03-15 RX ADMIN — LEVOFLOXACIN SCH MLS/HR: 5 INJECTION, SOLUTION INTRAVENOUS at 17:24

## 2020-03-15 RX ADMIN — SERTRALINE HYDROCHLORIDE SCH MG: 50 TABLET ORAL at 08:50

## 2020-03-15 RX ADMIN — MORPHINE SULFATE PRN MG: 2 INJECTION, SOLUTION INTRAMUSCULAR; INTRAVENOUS at 22:57

## 2020-03-15 RX ADMIN — INSULIN LISPRO PRN UNITS: 100 INJECTION, SOLUTION INTRAVENOUS; SUBCUTANEOUS at 12:03

## 2020-03-15 RX ADMIN — Medication SCH DEV: at 12:01

## 2020-03-15 RX ADMIN — INSULIN LISPRO PRN UNITS: 100 INJECTION, SOLUTION INTRAVENOUS; SUBCUTANEOUS at 20:07

## 2020-03-15 RX ADMIN — INSULIN LISPRO PRN UNITS: 100 INJECTION, SOLUTION INTRAVENOUS; SUBCUTANEOUS at 17:32

## 2020-03-15 RX ADMIN — Medication SCH DEV: at 19:58

## 2020-03-15 RX ADMIN — HYDROCODONE BITARTRATE AND ACETAMINOPHEN PRN TAB: 5; 325 TABLET ORAL at 19:54

## 2020-03-15 RX ADMIN — LISINOPRIL SCH MG: 10 TABLET ORAL at 08:49

## 2020-03-15 RX ADMIN — ZOLPIDEM TARTRATE SCH MG: 10 TABLET, FILM COATED ORAL at 20:01

## 2020-03-15 RX ADMIN — HYDROCHLOROTHIAZIDE SCH MG: 25 TABLET ORAL at 08:50

## 2020-03-15 RX ADMIN — SODIUM CHLORIDE PRN MG: 9 INJECTION, SOLUTION INTRAVENOUS at 14:58

## 2020-03-15 RX ADMIN — IPRATROPIUM BROMIDE AND ALBUTEROL SULFATE SCH ML: .5; 3 SOLUTION RESPIRATORY (INHALATION) at 23:28

## 2020-03-15 RX ADMIN — HYDROCODONE BITARTRATE AND ACETAMINOPHEN PRN TAB: 5; 325 TABLET ORAL at 14:58

## 2020-03-15 RX ADMIN — WATER SCH MG: 1 INJECTION INTRAMUSCULAR; INTRAVENOUS; SUBCUTANEOUS at 05:40

## 2020-03-15 RX ADMIN — IPRATROPIUM BROMIDE AND ALBUTEROL SULFATE SCH ML: .5; 3 SOLUTION RESPIRATORY (INHALATION) at 15:00

## 2020-03-15 RX ADMIN — SODIUM CHLORIDE PRN MG: 9 INJECTION, SOLUTION INTRAVENOUS at 09:05

## 2020-03-15 RX ADMIN — WATER SCH MG: 1 INJECTION INTRAMUSCULAR; INTRAVENOUS; SUBCUTANEOUS at 20:02

## 2020-03-15 RX ADMIN — Medication SCH MG: at 08:50

## 2020-03-15 RX ADMIN — Medication SCH DEV: at 16:37

## 2020-03-15 RX ADMIN — SODIUM CHLORIDE PRN MG: 9 INJECTION, SOLUTION INTRAVENOUS at 19:56

## 2020-03-15 RX ADMIN — HYDROCODONE BITARTRATE AND ACETAMINOPHEN PRN TAB: 5; 325 TABLET ORAL at 09:05

## 2020-03-15 RX ADMIN — IPRATROPIUM BROMIDE AND ALBUTEROL SULFATE SCH ML: .5; 3 SOLUTION RESPIRATORY (INHALATION) at 18:49

## 2020-03-15 RX ADMIN — IPRATROPIUM BROMIDE AND ALBUTEROL SULFATE SCH ML: .5; 3 SOLUTION RESPIRATORY (INHALATION) at 10:47

## 2020-03-15 RX ADMIN — Medication SCH DEV: at 06:36

## 2020-03-15 RX ADMIN — ENOXAPARIN SODIUM SCH MG: 40 INJECTION SUBCUTANEOUS at 08:47

## 2020-03-15 RX ADMIN — HYDROCODONE BITARTRATE AND ACETAMINOPHEN PRN TAB: 5; 325 TABLET ORAL at 04:36

## 2020-03-15 RX ADMIN — LEVOTHYROXINE SODIUM SCH MG: 100 TABLET ORAL at 05:46

## 2020-03-15 RX ADMIN — INSULIN LISPRO PRN UNITS: 100 INJECTION, SOLUTION INTRAVENOUS; SUBCUTANEOUS at 06:36

## 2020-03-15 RX ADMIN — IPRATROPIUM BROMIDE AND ALBUTEROL SULFATE SCH ML: .5; 3 SOLUTION RESPIRATORY (INHALATION) at 06:46

## 2020-03-16 VITALS — DIASTOLIC BLOOD PRESSURE: 40 MMHG | SYSTOLIC BLOOD PRESSURE: 100 MMHG

## 2020-03-16 VITALS — DIASTOLIC BLOOD PRESSURE: 74 MMHG | SYSTOLIC BLOOD PRESSURE: 124 MMHG

## 2020-03-16 VITALS — DIASTOLIC BLOOD PRESSURE: 65 MMHG | SYSTOLIC BLOOD PRESSURE: 124 MMHG

## 2020-03-16 VITALS — DIASTOLIC BLOOD PRESSURE: 84 MMHG | SYSTOLIC BLOOD PRESSURE: 121 MMHG

## 2020-03-16 RX ADMIN — LEVOTHYROXINE SODIUM SCH MG: 100 TABLET ORAL at 05:49

## 2020-03-16 RX ADMIN — HYDROCHLOROTHIAZIDE SCH MG: 25 TABLET ORAL at 08:52

## 2020-03-16 RX ADMIN — WATER SCH MG: 1 INJECTION INTRAMUSCULAR; INTRAVENOUS; SUBCUTANEOUS at 08:52

## 2020-03-16 RX ADMIN — SODIUM CHLORIDE PRN MG: 9 INJECTION, SOLUTION INTRAVENOUS at 04:11

## 2020-03-16 RX ADMIN — Medication SCH DEV: at 11:30

## 2020-03-16 RX ADMIN — IPRATROPIUM BROMIDE AND ALBUTEROL SULFATE SCH ML: .5; 3 SOLUTION RESPIRATORY (INHALATION) at 03:53

## 2020-03-16 RX ADMIN — IPRATROPIUM BROMIDE AND ALBUTEROL SULFATE SCH ML: .5; 3 SOLUTION RESPIRATORY (INHALATION) at 07:05

## 2020-03-16 RX ADMIN — LISINOPRIL SCH MG: 10 TABLET ORAL at 08:52

## 2020-03-16 RX ADMIN — ENOXAPARIN SODIUM SCH MG: 40 INJECTION SUBCUTANEOUS at 09:00

## 2020-03-16 RX ADMIN — Medication SCH MG: at 08:51

## 2020-03-16 RX ADMIN — Medication SCH DEV: at 06:49

## 2020-03-16 RX ADMIN — INSULIN LISPRO PRN UNITS: 100 INJECTION, SOLUTION INTRAVENOUS; SUBCUTANEOUS at 06:51

## 2020-03-16 RX ADMIN — MORPHINE SULFATE PRN MG: 2 INJECTION, SOLUTION INTRAMUSCULAR; INTRAVENOUS at 03:52

## 2020-03-16 RX ADMIN — IPRATROPIUM BROMIDE AND ALBUTEROL SULFATE SCH ML: .5; 3 SOLUTION RESPIRATORY (INHALATION) at 11:12

## 2020-03-16 RX ADMIN — MORPHINE SULFATE PRN MG: 2 INJECTION, SOLUTION INTRAMUSCULAR; INTRAVENOUS at 15:16

## 2020-03-16 RX ADMIN — SERTRALINE HYDROCHLORIDE SCH MG: 50 TABLET ORAL at 08:51

## 2020-03-16 RX ADMIN — SODIUM CHLORIDE PRN MG: 9 INJECTION, SOLUTION INTRAVENOUS at 07:46

## 2020-03-16 RX ADMIN — MORPHINE SULFATE PRN MG: 2 INJECTION, SOLUTION INTRAMUSCULAR; INTRAVENOUS at 07:46

## 2020-11-22 ENCOUNTER — HOSPITAL ENCOUNTER (EMERGENCY)
Dept: HOSPITAL 26 - MED | Age: 60
Discharge: HOME | End: 2020-11-22
Payer: COMMERCIAL

## 2020-11-22 VITALS — SYSTOLIC BLOOD PRESSURE: 132 MMHG | DIASTOLIC BLOOD PRESSURE: 86 MMHG

## 2020-11-22 VITALS — WEIGHT: 293 LBS | BODY MASS INDEX: 48.82 KG/M2 | HEIGHT: 65 IN

## 2020-11-22 DIAGNOSIS — E07.9: ICD-10-CM

## 2020-11-22 DIAGNOSIS — J81.1: Primary | ICD-10-CM

## 2020-11-22 DIAGNOSIS — Z79.899: ICD-10-CM

## 2020-11-22 DIAGNOSIS — M79.89: ICD-10-CM

## 2020-11-22 DIAGNOSIS — I10: ICD-10-CM

## 2020-11-22 DIAGNOSIS — E11.9: ICD-10-CM

## 2020-11-22 DIAGNOSIS — Z88.0: ICD-10-CM

## 2020-11-22 DIAGNOSIS — J44.9: ICD-10-CM

## 2020-11-22 LAB
ALBUMIN FLD-MCNC: 3.7 G/DL (ref 3.4–5)
ANION GAP SERPL CALCULATED.3IONS-SCNC: 15.2 MMOL/L (ref 8–16)
AST SERPL-CCNC: 27 U/L (ref 15–37)
BASOPHILS # BLD AUTO: 0.1 K/UL (ref 0–0.22)
BASOPHILS NFR BLD AUTO: 1.3 % (ref 0–2)
BILIRUB SERPL-MCNC: 0.6 MG/DL (ref 0–1)
BUN SERPL-MCNC: 8 MG/DL (ref 7–18)
CHLORIDE SERPL-SCNC: 105 MMOL/L (ref 98–107)
CO2 SERPL-SCNC: 25.9 MMOL/L (ref 21–32)
CREAT SERPL-MCNC: 0.9 MG/DL (ref 0.6–1.3)
EOSINOPHIL # BLD AUTO: 0.1 K/UL (ref 0–0.4)
EOSINOPHIL NFR BLD AUTO: 2 % (ref 0–4)
ERYTHROCYTE [DISTWIDTH] IN BLOOD BY AUTOMATED COUNT: 14.8 % (ref 11.6–13.7)
GFR SERPL CREATININE-BSD FRML MDRD: 83 ML/MIN (ref 90–?)
GLUCOSE SERPL-MCNC: 106 MG/DL (ref 74–106)
HCT VFR BLD AUTO: 41.3 % (ref 36–48)
HGB BLD-MCNC: 13.8 G/DL (ref 12–16)
LYMPHOCYTES # BLD AUTO: 2 K/UL (ref 2.5–16.5)
LYMPHOCYTES NFR BLD AUTO: 31 % (ref 20.5–51.1)
MCH RBC QN AUTO: 30 PG (ref 27–31)
MCHC RBC AUTO-ENTMCNC: 34 G/DL (ref 33–37)
MCV RBC AUTO: 88.1 FL (ref 80–94)
MONOCYTES # BLD AUTO: 0.6 K/UL (ref 0.8–1)
MONOCYTES NFR BLD AUTO: 8.8 % (ref 1.7–9.3)
NEUTROPHILS # BLD AUTO: 3.7 K/UL (ref 1.8–7.7)
NEUTROPHILS NFR BLD AUTO: 56.9 % (ref 42.2–75.2)
PLATELET # BLD AUTO: 213 K/UL (ref 140–450)
POTASSIUM SERPL-SCNC: 4.1 MMOL/L (ref 3.5–5.1)
RBC # BLD AUTO: 4.69 MIL/UL (ref 4.2–5.4)
SODIUM SERPL-SCNC: 142 MMOL/L (ref 136–145)
WBC # BLD AUTO: 6.6 K/UL (ref 4.8–10.8)

## 2020-11-22 NOTE — NUR
Patient discharged with v/s stable. Written and verbal after care instructions 
given and explained. 

Patient alert, oriented and verbalized understanding of instructions. Wheel 
Chair Assisted to car. All questions addressed prior to discharge. ID band 
removed. Patient advised to follow up with PMD. Rx of LASIX  given. Patient 
educated on indication of medication including possible reaction and side 
effects. Opportunity to ask questions provided and answered.
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
## 2021-04-11 ENCOUNTER — HOSPITAL ENCOUNTER (EMERGENCY)
Dept: HOSPITAL 26 - MED | Age: 61
Discharge: HOME | End: 2021-04-11
Payer: COMMERCIAL

## 2021-04-11 VITALS — BODY MASS INDEX: 48.82 KG/M2 | HEIGHT: 65 IN | WEIGHT: 293 LBS

## 2021-04-11 VITALS — DIASTOLIC BLOOD PRESSURE: 91 MMHG | SYSTOLIC BLOOD PRESSURE: 151 MMHG

## 2021-04-11 VITALS — SYSTOLIC BLOOD PRESSURE: 151 MMHG | DIASTOLIC BLOOD PRESSURE: 91 MMHG
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
## 2021-04-11 DIAGNOSIS — T25.122A: ICD-10-CM

## 2021-04-11 DIAGNOSIS — Z88.0: ICD-10-CM
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
## 2021-04-11 DIAGNOSIS — T25.121A: Primary | ICD-10-CM
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
## 2021-04-11 DIAGNOSIS — E11.9: ICD-10-CM
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
## 2021-04-11 DIAGNOSIS — J44.9: ICD-10-CM

## 2021-04-11 DIAGNOSIS — I10: ICD-10-CM

## 2021-04-11 DIAGNOSIS — Z79.899: ICD-10-CM

## 2021-04-11 PROCEDURE — 96372 THER/PROPH/DIAG INJ SC/IM: CPT

## 2021-04-11 PROCEDURE — 16000 INITIAL TREATMENT OF BURN(S): CPT

## 2021-04-11 PROCEDURE — 99283 EMERGENCY DEPT VISIT LOW MDM: CPT
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
## 2021-04-11 NOTE — NUR
Patient discharged with v/s stable. Written and verbal after care instructions 
given and explained. 

Patient alert, oriented and verbalized understanding of instructions. Wheel 
Chair Assisted with to car. All questions addressed prior to discharge. ID band 
removed. Patient advised to follow up with PMD. Rx of Bacitracin, naprosyn, 
Norco given. Patient educated on indication of medication including possible 
reaction and side effects. Opportunity to ask questions provided and answered.

## 2022-01-24 ENCOUNTER — HOSPITAL ENCOUNTER (OUTPATIENT)
Dept: HOSPITAL 26 - MED | Age: 62
Setting detail: OBSERVATION
LOS: 2 days | Discharge: HOME | End: 2022-01-26
Attending: FAMILY MEDICINE | Admitting: FAMILY MEDICINE
Payer: COMMERCIAL

## 2022-01-24 VITALS — SYSTOLIC BLOOD PRESSURE: 111 MMHG | DIASTOLIC BLOOD PRESSURE: 62 MMHG

## 2022-01-24 VITALS — DIASTOLIC BLOOD PRESSURE: 93 MMHG | SYSTOLIC BLOOD PRESSURE: 131 MMHG

## 2022-01-24 VITALS — BODY MASS INDEX: 44.98 KG/M2 | WEIGHT: 270 LBS | HEIGHT: 65 IN

## 2022-01-24 DIAGNOSIS — R00.0: ICD-10-CM

## 2022-01-24 DIAGNOSIS — Z79.899: ICD-10-CM

## 2022-01-24 DIAGNOSIS — I10: ICD-10-CM

## 2022-01-24 DIAGNOSIS — D72.829: ICD-10-CM

## 2022-01-24 DIAGNOSIS — Z99.81: ICD-10-CM

## 2022-01-24 DIAGNOSIS — F32.9: ICD-10-CM

## 2022-01-24 DIAGNOSIS — J44.1: Primary | ICD-10-CM

## 2022-01-24 DIAGNOSIS — E78.5: ICD-10-CM

## 2022-01-24 DIAGNOSIS — J96.01: ICD-10-CM

## 2022-01-24 DIAGNOSIS — F41.9: ICD-10-CM

## 2022-01-24 LAB
ALBUMIN FLD-MCNC: 3.3 G/DL (ref 3.4–5)
ANION GAP SERPL CALCULATED.3IONS-SCNC: 17.1 MMOL/L (ref 8–16)
AST SERPL-CCNC: 18 U/L (ref 15–37)
BASOPHILS # BLD AUTO: 0 K/UL (ref 0–0.22)
BASOPHILS NFR BLD AUTO: 0.2 % (ref 0–2)
BILIRUB SERPL-MCNC: 1.5 MG/DL (ref 0–1)
BUN SERPL-MCNC: 15 MG/DL (ref 7–18)
CHLORIDE SERPL-SCNC: 101 MMOL/L (ref 98–107)
CO2 SERPL-SCNC: 27.8 MMOL/L (ref 21–32)
CREAT SERPL-MCNC: 1.1 MG/DL (ref 0.6–1.3)
EOSINOPHIL # BLD AUTO: 0 K/UL (ref 0–0.4)
EOSINOPHIL NFR BLD AUTO: 0 % (ref 0–4)
ERYTHROCYTE [DISTWIDTH] IN BLOOD BY AUTOMATED COUNT: 15.6 % (ref 11.6–13.7)
GFR SERPL CREATININE-BSD FRML MDRD: 65 ML/MIN (ref 90–?)
GLUCOSE SERPL-MCNC: 120 MG/DL (ref 74–106)
HCT VFR BLD AUTO: 41.6 % (ref 36–48)
HGB BLD-MCNC: 13.9 G/DL (ref 12–16)
LYMPHOCYTES # BLD AUTO: 1.4 K/UL (ref 2.5–16.5)
LYMPHOCYTES NFR BLD AUTO: 6.4 % (ref 20.5–51.1)
MCH RBC QN AUTO: 29 PG (ref 27–31)
MCHC RBC AUTO-ENTMCNC: 33 G/DL (ref 33–37)
MCV RBC AUTO: 87.8 FL (ref 80–94)
MONOCYTES # BLD AUTO: 0.9 K/UL (ref 0.8–1)
MONOCYTES NFR BLD AUTO: 4.3 % (ref 1.7–9.3)
NEUTROPHILS # BLD AUTO: 19.1 K/UL (ref 1.8–7.7)
NEUTROPHILS NFR BLD AUTO: 89.1 % (ref 42.2–75.2)
PLATELET # BLD AUTO: 185 K/UL (ref 140–450)
POTASSIUM SERPL-SCNC: 3.9 MMOL/L (ref 3.5–5.1)
RBC # BLD AUTO: 4.74 MIL/UL (ref 4.2–5.4)
SODIUM SERPL-SCNC: 142 MMOL/L (ref 136–145)
WBC # BLD AUTO: 21.4 K/UL (ref 4.8–10.8)

## 2022-01-24 PROCEDURE — 96367 TX/PROPH/DG ADDL SEQ IV INF: CPT

## 2022-01-24 PROCEDURE — 85025 COMPLETE CBC W/AUTO DIFF WBC: CPT

## 2022-01-24 PROCEDURE — 96376 TX/PRO/DX INJ SAME DRUG ADON: CPT

## 2022-01-24 PROCEDURE — 83605 ASSAY OF LACTIC ACID: CPT

## 2022-01-24 PROCEDURE — 71045 X-RAY EXAM CHEST 1 VIEW: CPT

## 2022-01-24 PROCEDURE — 82803 BLOOD GASES ANY COMBINATION: CPT

## 2022-01-24 PROCEDURE — 99285 EMERGENCY DEPT VISIT HI MDM: CPT

## 2022-01-24 PROCEDURE — 80053 COMPREHEN METABOLIC PANEL: CPT

## 2022-01-24 PROCEDURE — 85379 FIBRIN DEGRADATION QUANT: CPT

## 2022-01-24 PROCEDURE — 84484 ASSAY OF TROPONIN QUANT: CPT

## 2022-01-24 PROCEDURE — 87426 SARSCOV CORONAVIRUS AG IA: CPT

## 2022-01-24 PROCEDURE — G0378 HOSPITAL OBSERVATION PER HR: HCPCS

## 2022-01-24 PROCEDURE — 87040 BLOOD CULTURE FOR BACTERIA: CPT

## 2022-01-24 PROCEDURE — 36600 WITHDRAWAL OF ARTERIAL BLOOD: CPT

## 2022-01-24 PROCEDURE — 94760 N-INVAS EAR/PLS OXIMETRY 1: CPT

## 2022-01-24 PROCEDURE — 96375 TX/PRO/DX INJ NEW DRUG ADDON: CPT

## 2022-01-24 PROCEDURE — 96366 THER/PROPH/DIAG IV INF ADDON: CPT

## 2022-01-24 PROCEDURE — 96372 THER/PROPH/DIAG INJ SC/IM: CPT

## 2022-01-24 PROCEDURE — 36415 COLL VENOUS BLD VENIPUNCTURE: CPT

## 2022-01-24 PROCEDURE — 83735 ASSAY OF MAGNESIUM: CPT

## 2022-01-24 PROCEDURE — 96365 THER/PROPH/DIAG IV INF INIT: CPT

## 2022-01-24 PROCEDURE — 87081 CULTURE SCREEN ONLY: CPT

## 2022-01-24 PROCEDURE — 94640 AIRWAY INHALATION TREATMENT: CPT

## 2022-01-24 RX ADMIN — SODIUM CHLORIDE PRN MG: 9 INJECTION, SOLUTION INTRAVENOUS at 20:31

## 2022-01-24 RX ADMIN — WATER SCH MG: 1 INJECTION INTRAMUSCULAR; INTRAVENOUS; SUBCUTANEOUS at 23:12

## 2022-01-24 RX ADMIN — IPRATROPIUM BROMIDE AND ALBUTEROL SULFATE SCH ML: .5; 3 SOLUTION RESPIRATORY (INHALATION) at 19:53

## 2022-01-24 NOTE — NUR
FOLLOW UP HHN THERAPY AND RESPIRATORY DRUG GIVEN AS ORDERED ENCOURAGE PTIENT 
FOR DEEP BREATHING AND COUGH

## 2022-01-24 NOTE — NUR
PT LAYING IN BED AWAKE EATING DINNER W HOB ELEVATED, BED LOCKED IN LOWEST 
POSITION W X2 SIDERAILS UP FOR PT SAFETY. PT REPORT SLIGHT DIFF BREATHING, 
WHEEZING AUSCULTATED, BUT REPORTS SHE FEELS LIKE HER BREATHING HAS BEEN 
IMPROVING PT O2 SAT 95% ON 3L NC. PT C/O UPPER BACK PAIN 7/10, + SOME NAUSEA 
BUT REPORTS OTHERWISE FELING BETTER.

## 2022-01-24 NOTE — NUR
RECEIVED PT IN [facility] AOX4. C/O HEADACHE 4/10 PAIN AT THIS TIME. IV INSERTED TO 
LEFT SHOULDER #22GUAGE. MEDS GIVEN PER ORDER. ON 2LNC SATURATION 93% PT 
NORMALLY ON HOME 02 AT 2LNC. PT PENDING ADMISSION FOR COPD. NO ACUTE DISTRESS 
NOTED. SAFETY MAINTAINED.

## 2022-01-24 NOTE — NUR
HHN THERAPY AND RESPIRATORY DRUG GIVEN AS ORDERED ENCOURAGED PATIENT FOR DEEP 
BREATH AND COUGH DURING THERAPY

## 2022-01-24 NOTE — NUR
Patient will be admitted to care of DR. MCKOY. Admited to TELEMETRY. 
Belongings list completed.  Report to BARBI JAMISON.

## 2022-01-25 VITALS — SYSTOLIC BLOOD PRESSURE: 109 MMHG | DIASTOLIC BLOOD PRESSURE: 54 MMHG

## 2022-01-25 VITALS — DIASTOLIC BLOOD PRESSURE: 69 MMHG | SYSTOLIC BLOOD PRESSURE: 135 MMHG

## 2022-01-25 VITALS — SYSTOLIC BLOOD PRESSURE: 152 MMHG | DIASTOLIC BLOOD PRESSURE: 86 MMHG

## 2022-01-25 VITALS — SYSTOLIC BLOOD PRESSURE: 132 MMHG | DIASTOLIC BLOOD PRESSURE: 65 MMHG

## 2022-01-25 VITALS — DIASTOLIC BLOOD PRESSURE: 78 MMHG | SYSTOLIC BLOOD PRESSURE: 133 MMHG

## 2022-01-25 LAB
ALBUMIN FLD-MCNC: 3 G/DL (ref 3.4–5)
ANION GAP SERPL CALCULATED.3IONS-SCNC: 13.6 MMOL/L (ref 8–16)
AST SERPL-CCNC: 12 U/L (ref 15–37)
BASOPHILS # BLD AUTO: 0 K/UL (ref 0–0.22)
BASOPHILS NFR BLD AUTO: 0.1 % (ref 0–2)
BILIRUB SERPL-MCNC: 0.7 MG/DL (ref 0–1)
BUN SERPL-MCNC: 20 MG/DL (ref 7–18)
CHLORIDE SERPL-SCNC: 101 MMOL/L (ref 98–107)
CO2 SERPL-SCNC: 27.8 MMOL/L (ref 21–32)
CREAT SERPL-MCNC: 0.9 MG/DL (ref 0.6–1.3)
EOSINOPHIL # BLD AUTO: 0 K/UL (ref 0–0.4)
EOSINOPHIL NFR BLD AUTO: 0 % (ref 0–4)
ERYTHROCYTE [DISTWIDTH] IN BLOOD BY AUTOMATED COUNT: 15.9 % (ref 11.6–13.7)
GFR SERPL CREATININE-BSD FRML MDRD: 82 ML/MIN (ref 90–?)
GLUCOSE SERPL-MCNC: 156 MG/DL (ref 74–106)
HCT VFR BLD AUTO: 36.9 % (ref 36–48)
HGB BLD-MCNC: 12.3 G/DL (ref 12–16)
LYMPHOCYTES # BLD AUTO: 0.7 K/UL (ref 2.5–16.5)
LYMPHOCYTES NFR BLD AUTO: 3.2 % (ref 20.5–51.1)
MAGNESIUM SERPL-MCNC: 2 MG/DL (ref 1.8–2.4)
MCH RBC QN AUTO: 29 PG (ref 27–31)
MCHC RBC AUTO-ENTMCNC: 33 G/DL (ref 33–37)
MCV RBC AUTO: 87.7 FL (ref 80–94)
MONOCYTES # BLD AUTO: 0.6 K/UL (ref 0.8–1)
MONOCYTES NFR BLD AUTO: 3.1 % (ref 1.7–9.3)
NEUTROPHILS # BLD AUTO: 19.2 K/UL (ref 1.8–7.7)
NEUTROPHILS NFR BLD AUTO: 93.6 % (ref 42.2–75.2)
PLATELET # BLD AUTO: 177 K/UL (ref 140–450)
POTASSIUM SERPL-SCNC: 3.4 MMOL/L (ref 3.5–5.1)
RBC # BLD AUTO: 4.2 MIL/UL (ref 4.2–5.4)
SODIUM SERPL-SCNC: 139 MMOL/L (ref 136–145)
WBC # BLD AUTO: 20.5 K/UL (ref 4.8–10.8)

## 2022-01-25 RX ADMIN — IPRATROPIUM BROMIDE AND ALBUTEROL SULFATE SCH ML: .5; 3 SOLUTION RESPIRATORY (INHALATION) at 21:10

## 2022-01-25 RX ADMIN — WATER SCH MG: 1 INJECTION INTRAMUSCULAR; INTRAVENOUS; SUBCUTANEOUS at 05:21

## 2022-01-25 RX ADMIN — WATER SCH MG: 1 INJECTION INTRAMUSCULAR; INTRAVENOUS; SUBCUTANEOUS at 12:13

## 2022-01-25 RX ADMIN — WATER SCH MG: 1 INJECTION INTRAMUSCULAR; INTRAVENOUS; SUBCUTANEOUS at 20:57

## 2022-01-25 RX ADMIN — BUDESONIDE SCH MG: 0.5 SUSPENSION RESPIRATORY (INHALATION) at 21:10

## 2022-01-25 RX ADMIN — SODIUM CHLORIDE PRN MG: 9 INJECTION, SOLUTION INTRAVENOUS at 12:13

## 2022-01-25 RX ADMIN — SODIUM CHLORIDE PRN MG: 9 INJECTION, SOLUTION INTRAVENOUS at 01:08

## 2022-01-25 RX ADMIN — SODIUM CHLORIDE PRN MG: 9 INJECTION, SOLUTION INTRAVENOUS at 19:00

## 2022-01-25 RX ADMIN — SODIUM CHLORIDE PRN MG: 9 INJECTION, SOLUTION INTRAVENOUS at 06:47

## 2022-01-25 RX ADMIN — IPRATROPIUM BROMIDE AND ALBUTEROL SULFATE SCH ML: .5; 3 SOLUTION RESPIRATORY (INHALATION) at 00:09

## 2022-01-25 RX ADMIN — IPRATROPIUM BROMIDE AND ALBUTEROL SULFATE SCH ML: .5; 3 SOLUTION RESPIRATORY (INHALATION) at 08:01

## 2022-01-25 RX ADMIN — ENOXAPARIN SODIUM SCH MG: 40 INJECTION SUBCUTANEOUS at 09:16

## 2022-01-25 RX ADMIN — IPRATROPIUM BROMIDE AND ALBUTEROL SULFATE SCH ML: .5; 3 SOLUTION RESPIRATORY (INHALATION) at 14:31

## 2022-01-25 RX ADMIN — DEXTROSE SCH MLS/HR: 50 INJECTION, SOLUTION INTRAVENOUS at 09:31

## 2022-01-25 RX ADMIN — DOCUSATE SODIUM SCH MG: 100 CAPSULE, LIQUID FILLED ORAL at 09:16

## 2022-01-25 NOTE — NUR
Received patient from AM shift nurse. Patient is AA&Ox4 able to make needs known, denies 
chest pain or SOB. Chest rise is even and unlabored on 2L NC. Normal hear sounds present. 
Active bowel sounds x4 on auscultation. Denies pain with palpation and no distention is 
noted. Patient has a PIV to the forearm that is patent and flushes well. Patient has call 
light within reach, bed is locked in the lowest position with bed rails up. Will continue to 
monitor.

## 2022-01-25 NOTE — NUR
RECEIVED REPORT FROM NIGHT SHIFT NURSE FOR CONTINUITY OF CARE, POC DISCUSSED. PT IS STABLE 
IN BED WITH NO ACUTE S/S OF DISTRESS. PT ABLE TO MAKE NEEDS KNOWN. ALL SAFETY MEASURES IN 
PLACE, CALL LIGHT WITHIN REACH. WILL CONTINUE TO MONITOR.

## 2022-01-25 NOTE — NUR
Patient is currently in bed resting no s/s of distress is noted. Patient requested pain 
medication at 0645 and received med at 0650. Patient is stable and all current needs have 
been met will differ further care to AM shift for continuity of care.

## 2022-01-25 NOTE — NUR
PATIENT HAS BEEN SCREENED AND CATEGORIZED AS HIGH NUTRITION RISK. PATIENT WILL BE SEEN 
WITHIN 1-2 DAYS OF ADMISSION.



1/25/221/26/22 



YVONNE NAGEL RD

## 2022-01-25 NOTE — NUR
PAIN MED AND ZOFRAN ADMINISTERED AT 1900 FOR COMPLAINTS OF PAIN. PT ENDORSED TO NIGHT SHIFT 
NURSE IN STABLE CONDITION.

## 2022-01-26 VITALS — DIASTOLIC BLOOD PRESSURE: 84 MMHG | SYSTOLIC BLOOD PRESSURE: 135 MMHG

## 2022-01-26 VITALS — SYSTOLIC BLOOD PRESSURE: 123 MMHG | DIASTOLIC BLOOD PRESSURE: 70 MMHG

## 2022-01-26 VITALS — SYSTOLIC BLOOD PRESSURE: 129 MMHG | DIASTOLIC BLOOD PRESSURE: 75 MMHG

## 2022-01-26 LAB
ALBUMIN FLD-MCNC: 2.9 G/DL (ref 3.4–5)
ANION GAP SERPL CALCULATED.3IONS-SCNC: 13.6 MMOL/L (ref 8–16)
AST SERPL-CCNC: 10 U/L (ref 15–37)
BASOPHILS # BLD AUTO: 0 K/UL (ref 0–0.22)
BASOPHILS NFR BLD AUTO: 0.1 % (ref 0–2)
BILIRUB SERPL-MCNC: 0.4 MG/DL (ref 0–1)
BUN SERPL-MCNC: 21 MG/DL (ref 7–18)
CHLORIDE SERPL-SCNC: 102 MMOL/L (ref 98–107)
CO2 SERPL-SCNC: 27.2 MMOL/L (ref 21–32)
CREAT SERPL-MCNC: 0.7 MG/DL (ref 0.6–1.3)
EOSINOPHIL # BLD AUTO: 0 K/UL (ref 0–0.4)
EOSINOPHIL NFR BLD AUTO: 0 % (ref 0–4)
ERYTHROCYTE [DISTWIDTH] IN BLOOD BY AUTOMATED COUNT: 15.6 % (ref 11.6–13.7)
GFR SERPL CREATININE-BSD FRML MDRD: 110 ML/MIN (ref 90–?)
GLUCOSE SERPL-MCNC: 173 MG/DL (ref 74–106)
HCT VFR BLD AUTO: 36 % (ref 36–48)
HGB BLD-MCNC: 12.2 G/DL (ref 12–16)
LYMPHOCYTES # BLD AUTO: 0.6 K/UL (ref 2.5–16.5)
LYMPHOCYTES NFR BLD AUTO: 3.4 % (ref 20.5–51.1)
MAGNESIUM SERPL-MCNC: 2.4 MG/DL (ref 1.8–2.4)
MCH RBC QN AUTO: 30 PG (ref 27–31)
MCHC RBC AUTO-ENTMCNC: 34 G/DL (ref 33–37)
MCV RBC AUTO: 87 FL (ref 80–94)
MONOCYTES # BLD AUTO: 0.5 K/UL (ref 0.8–1)
MONOCYTES NFR BLD AUTO: 2.7 % (ref 1.7–9.3)
NEUTROPHILS # BLD AUTO: 15.8 K/UL (ref 1.8–7.7)
NEUTROPHILS NFR BLD AUTO: 93.8 % (ref 42.2–75.2)
PLATELET # BLD AUTO: 213 K/UL (ref 140–450)
POTASSIUM SERPL-SCNC: 3.8 MMOL/L (ref 3.5–5.1)
RBC # BLD AUTO: 4.14 MIL/UL (ref 4.2–5.4)
SODIUM SERPL-SCNC: 139 MMOL/L (ref 136–145)
WBC # BLD AUTO: 16.8 K/UL (ref 4.8–10.8)

## 2022-01-26 RX ADMIN — BUDESONIDE SCH MG: 0.5 SUSPENSION RESPIRATORY (INHALATION) at 07:46

## 2022-01-26 RX ADMIN — SODIUM CHLORIDE PRN MG: 9 INJECTION, SOLUTION INTRAVENOUS at 10:25

## 2022-01-26 RX ADMIN — SODIUM CHLORIDE PRN MG: 9 INJECTION, SOLUTION INTRAVENOUS at 03:28

## 2022-01-26 RX ADMIN — IPRATROPIUM BROMIDE AND ALBUTEROL SULFATE SCH ML: .5; 3 SOLUTION RESPIRATORY (INHALATION) at 02:45

## 2022-01-26 RX ADMIN — DEXTROSE SCH MLS/HR: 50 INJECTION, SOLUTION INTRAVENOUS at 10:21

## 2022-01-26 RX ADMIN — WATER SCH MG: 1 INJECTION INTRAMUSCULAR; INTRAVENOUS; SUBCUTANEOUS at 03:28

## 2022-01-26 RX ADMIN — WATER SCH MG: 1 INJECTION INTRAMUSCULAR; INTRAVENOUS; SUBCUTANEOUS at 13:00

## 2022-01-26 RX ADMIN — DOCUSATE SODIUM SCH MG: 100 CAPSULE, LIQUID FILLED ORAL at 10:21

## 2022-01-26 RX ADMIN — IPRATROPIUM BROMIDE AND ALBUTEROL SULFATE SCH ML: .5; 3 SOLUTION RESPIRATORY (INHALATION) at 07:46

## 2022-01-26 RX ADMIN — ENOXAPARIN SODIUM SCH MG: 40 INJECTION SUBCUTANEOUS at 10:22

## 2022-01-26 NOTE — NUR
PT WAS GIVEN MORPHINE FOR PAIN IVP. PT STATES PAIN IN THE BACK AT A SCALE OF 8/10. ACHING IN 
CHARACTERISTIC. PT WAS ALSO GIVEN ZOFRAN FOR NAUSEA. WILL MONITOR PT.

## 2022-01-26 NOTE — NUR
RECEIVED BEDSIDE REPORT FROM NIGHT SHIFT NURSE FOR CONTINUITY OF CARE. PT IS AWAKE AND 
ALERT. A&OX4. ON 2L O2 NC. AMBULATORY INDEPENDENTLY. SKIN IS WARM, DRY, AND INTACT. IV IS IN 
THE LEFT FA 24 GAUGE SALINE LOCKED. PT IS STABLE. PLAN OF CARE DISCUSSED.

## 2022-01-26 NOTE — NUR
1/26/22 RD INITIAL ASSESSMENT COMPLETED



PLEASE REFER TO NUTRITION ASSESSMENT UNDER CARE ACTIVITY FOR ESTIMATED NUTRITIONAL NEEDS. 



1. CONTINUE CARDIAC DIET AS TOLERATED 

2. RECOMMEND GLUCERNA BID IF PO INTAKE IS < 75%

3. RD TO FOLLOW-UP 3-5 DAYS, MODERATE RISK (DOWNGRADED D/T PT EATING WELL)



YVONNE NAGEL RD

## 2022-01-26 NOTE — NUR
PT WAS DISCHARGED FROM HOSPITAL. PT WAS AWAKE AND ALERT. ON RA WITH BREATHING UNLABORED. NO 
RESPIRATORY DISTRESS NOTED. SKIN IS WARM AND INTACT. PT DENIES ANY NAUSEA OR VOMITING. 
DISCHARGE INSTRUCTIONS PROVIDED AND PT VERBALIZED UNDERSTANDING. ID BAND REMOVED. IV REMOVED 
AND BLEEDING CONTROLLED. PT IS STABLE.

## 2022-01-26 NOTE — NUR
PT STATES SHE NO LONGER HAS PAIN IN THE BACK. SHE IS ANSWERING QUESTIONS APPROPRIATELY. NO 
DISTRESS NOTED AT THIS TIME. PT STABLE.

## 2022-01-26 NOTE — NUR
ASSISTED PATIENT TO THE RESTROOM. GAIT WAS STEADY AND STANDBY ASSISTANCE WAS NOT NEEDED. 
BREATHING IS UNLABORED ON RA. PT DENIES PAIN. WILL CONTINUE TO MONITOR.

## 2022-01-26 NOTE — NUR
ROUNDED ON PT. SHE IS AWAKE AND TALKING APPROPRIATELY. ON RA WITH BREATHING UNLABORED. O2 
SAT IS 92%. PT IS LAYING ON LEFT SIDE, WATCHING TV. WILL CONTINUE TO MONITOR.

## 2022-05-29 ENCOUNTER — HOSPITAL ENCOUNTER (EMERGENCY)
Dept: HOSPITAL 26 - MED | Age: 62
Discharge: HOME | End: 2022-05-29
Payer: MEDICARE

## 2022-05-29 VITALS — DIASTOLIC BLOOD PRESSURE: 99 MMHG | SYSTOLIC BLOOD PRESSURE: 172 MMHG

## 2022-05-29 VITALS — SYSTOLIC BLOOD PRESSURE: 121 MMHG | DIASTOLIC BLOOD PRESSURE: 92 MMHG

## 2022-05-29 VITALS — HEIGHT: 64 IN | WEIGHT: 258.25 LBS | BODY MASS INDEX: 44.09 KG/M2

## 2022-05-29 DIAGNOSIS — J44.9: ICD-10-CM

## 2022-05-29 DIAGNOSIS — E11.9: ICD-10-CM

## 2022-05-29 DIAGNOSIS — Z86.39: ICD-10-CM

## 2022-05-29 DIAGNOSIS — K59.00: Primary | ICD-10-CM

## 2022-05-29 DIAGNOSIS — I10: ICD-10-CM

## 2022-05-29 DIAGNOSIS — Z98.890: ICD-10-CM

## 2022-05-29 DIAGNOSIS — Z88.0: ICD-10-CM

## 2022-05-29 DIAGNOSIS — Z79.1: ICD-10-CM

## 2022-05-29 DIAGNOSIS — Z79.899: ICD-10-CM

## 2022-05-29 DIAGNOSIS — Z79.891: ICD-10-CM

## 2022-05-29 DIAGNOSIS — Z71.6: ICD-10-CM

## 2022-05-29 LAB
ALBUMIN FLD-MCNC: 3.6 G/DL (ref 3.4–5)
ANION GAP SERPL CALCULATED.3IONS-SCNC: 9.6 MMOL/L (ref 8–16)
APPEARANCE UR: CLEAR
AST SERPL-CCNC: 14 U/L (ref 15–37)
BASOPHILS # BLD AUTO: 0.1 K/UL (ref 0–0.22)
BASOPHILS NFR BLD AUTO: 1.2 % (ref 0–2)
BILIRUB SERPL-MCNC: 0.4 MG/DL (ref 0–1)
BILIRUB UR QL STRIP: NEGATIVE
BUN SERPL-MCNC: 7 MG/DL (ref 7–18)
CHLORIDE SERPL-SCNC: 105 MMOL/L (ref 98–107)
CO2 SERPL-SCNC: 28.3 MMOL/L (ref 21–32)
COLOR UR: YELLOW
CREAT SERPL-MCNC: 0.7 MG/DL (ref 0.6–1.3)
EOSINOPHIL # BLD AUTO: 0.2 K/UL (ref 0–0.4)
EOSINOPHIL NFR BLD AUTO: 2 % (ref 0–4)
ERYTHROCYTE [DISTWIDTH] IN BLOOD BY AUTOMATED COUNT: 15.9 % (ref 11.6–13.7)
GFR SERPL CREATININE-BSD FRML MDRD: 110 ML/MIN (ref 90–?)
GLUCOSE SERPL-MCNC: 102 MG/DL (ref 74–106)
GLUCOSE UR STRIP-MCNC: NEGATIVE MG/DL
HCT VFR BLD AUTO: 43.6 % (ref 36–48)
HGB BLD-MCNC: 14.5 G/DL (ref 12–16)
HGB UR QL STRIP: NEGATIVE
LEUKOCYTE ESTERASE UR QL STRIP: NEGATIVE
LIPASE SERPL-CCNC: 41 U/L (ref 73–393)
LYMPHOCYTES # BLD AUTO: 2.4 K/UL (ref 2.5–16.5)
LYMPHOCYTES NFR BLD AUTO: 31.2 % (ref 20.5–51.1)
MCH RBC QN AUTO: 28 PG (ref 27–31)
MCHC RBC AUTO-ENTMCNC: 33 G/DL (ref 33–37)
MCV RBC AUTO: 83.9 FL (ref 80–94)
MONOCYTES # BLD AUTO: 0.7 K/UL (ref 0.8–1)
MONOCYTES NFR BLD AUTO: 9 % (ref 1.7–9.3)
NEUTROPHILS # BLD AUTO: 4.3 K/UL (ref 1.8–7.7)
NEUTROPHILS NFR BLD AUTO: 56.6 % (ref 42.2–75.2)
NITRITE UR QL STRIP: NEGATIVE
PH UR STRIP: 6 [PH] (ref 5–9)
PLATELET # BLD AUTO: 230 K/UL (ref 140–450)
POTASSIUM SERPL-SCNC: 3.9 MMOL/L (ref 3.5–5.1)
RBC # BLD AUTO: 5.2 MIL/UL (ref 4.2–5.4)
SODIUM SERPL-SCNC: 139 MMOL/L (ref 136–145)
WBC # BLD AUTO: 7.6 K/UL (ref 4.8–10.8)

## 2022-05-29 PROCEDURE — 74176 CT ABD & PELVIS W/O CONTRAST: CPT

## 2022-05-29 PROCEDURE — 36415 COLL VENOUS BLD VENIPUNCTURE: CPT

## 2022-05-29 PROCEDURE — 99284 EMERGENCY DEPT VISIT MOD MDM: CPT

## 2022-05-29 PROCEDURE — 85025 COMPLETE CBC W/AUTO DIFF WBC: CPT

## 2022-05-29 PROCEDURE — 80053 COMPREHEN METABOLIC PANEL: CPT

## 2022-05-29 PROCEDURE — 83690 ASSAY OF LIPASE: CPT

## 2022-05-29 PROCEDURE — 81003 URINALYSIS AUTO W/O SCOPE: CPT

## 2022-05-29 PROCEDURE — 96372 THER/PROPH/DIAG INJ SC/IM: CPT

## 2022-05-29 NOTE — NUR
Patient discharged with v/s stable. Written and verbal after care instructions 
given and explained. 

Patient alert, oriented and verbalized understanding of instructions. 
Ambulatory with steady gait. All questions addressed prior to discharge. ID 
band removed. Patient advised to follow up with PMD. Rx of CITROMA &  BENTYL 
given. Patient educated on indication of medication including possible reaction 
and side effects. Opportunity to ask questions provided and answered.
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
## 2023-09-24 ENCOUNTER — HOSPITAL ENCOUNTER (OUTPATIENT)
Dept: HOSPITAL 26 - MED | Age: 63
Setting detail: OBSERVATION
Discharge: LEFT BEFORE DISCHARGE | End: 2023-09-24
Attending: INTERNAL MEDICINE | Admitting: INTERNAL MEDICINE
Payer: MEDICARE

## 2023-09-24 VITALS
SYSTOLIC BLOOD PRESSURE: 166 MMHG | TEMPERATURE: 99.5 F | HEART RATE: 93 BPM | RESPIRATION RATE: 18 BRPM | DIASTOLIC BLOOD PRESSURE: 81 MMHG | OXYGEN SATURATION: 100 %

## 2023-09-24 VITALS
SYSTOLIC BLOOD PRESSURE: 113 MMHG | TEMPERATURE: 98 F | RESPIRATION RATE: 16 BRPM | DIASTOLIC BLOOD PRESSURE: 64 MMHG | HEART RATE: 74 BPM | OXYGEN SATURATION: 95 %

## 2023-09-24 VITALS — WEIGHT: 293 LBS | BODY MASS INDEX: 47.09 KG/M2 | HEIGHT: 66 IN

## 2023-09-24 DIAGNOSIS — J44.9: ICD-10-CM

## 2023-09-24 DIAGNOSIS — I10: ICD-10-CM

## 2023-09-24 DIAGNOSIS — S39.012A: Primary | ICD-10-CM

## 2023-09-24 DIAGNOSIS — E66.9: ICD-10-CM

## 2023-09-24 DIAGNOSIS — M25.559: ICD-10-CM

## 2023-09-24 DIAGNOSIS — Z88.0: ICD-10-CM

## 2023-09-24 DIAGNOSIS — R20.2: ICD-10-CM

## 2023-09-24 DIAGNOSIS — E78.5: ICD-10-CM

## 2023-09-24 DIAGNOSIS — J96.11: ICD-10-CM

## 2023-09-24 DIAGNOSIS — Z79.899: ICD-10-CM

## 2023-09-24 LAB
ALBUMIN FLD-MCNC: 3.6 G/DL (ref 3.4–5)
ALP SERPL-CCNC: 126 U/L (ref 50–136)
ALT SERPL-CCNC: 32 U/L (ref 12–78)
ANION GAP SERPL CALCULATED.3IONS-SCNC: 10.6 MMOL/L (ref 8–16)
APPEARANCE UR: CLEAR
APTT PPP: 27 SECS (ref 22–35.6)
AST SERPL-CCNC: 30 U/L (ref 15–37)
BACTERIA URNS QL MICRO: (no result) /HPF
BASOPHILS # BLD AUTO: 0.1 K/UL (ref 0–0.22)
BASOPHILS NFR BLD AUTO: 1.3 % (ref 0–2)
BILIRUB SERPL-MCNC: 0.6 MG/DL (ref 0–1)
BILIRUB UR QL STRIP: NEGATIVE
BUN SERPL-MCNC: 8 MG/DL (ref 7–18)
CALCIUM SPEC-MCNC: 10 MG/DL (ref 8.5–10.1)
CHLORIDE SERPL-SCNC: 103 MMOL/L (ref 98–107)
CO2 SERPL-SCNC: 28 MMOL/L (ref 21–32)
COLOR UR: YELLOW
CREAT SERPL-MCNC: 0.6 MG/DL (ref 0.6–1.3)
EOSINOPHIL # BLD AUTO: 0.2 K/UL (ref 0–0.4)
EOSINOPHIL NFR BLD AUTO: 2.3 % (ref 0–4)
ERYTHROCYTE [DISTWIDTH] IN BLOOD BY AUTOMATED COUNT: 16.2 % (ref 11.6–13.7)
GFR SERPL CREATININE-BSD FRML MDRD: 131 ML/MIN (ref 90–?)
GLUCOSE SERPL-MCNC: 118 MG/DL (ref 74–106)
GLUCOSE UR STRIP-MCNC: NEGATIVE MG/DL
HCT VFR BLD AUTO: 45.2 % (ref 36–48)
HGB BLD-MCNC: 14.9 G/DL (ref 12–16)
HGB UR QL STRIP: NEGATIVE
INR PPP: 0.9 (ref 0.8–1.2)
LEUKOCYTE ESTERASE UR QL STRIP: NEGATIVE
LYMPHOCYTES # BLD AUTO: 2.1 K/UL (ref 2.5–16.5)
LYMPHOCYTES NFR BLD AUTO: 32.3 % (ref 20.5–51.1)
MCH RBC QN AUTO: 27 PG (ref 27–31)
MCHC RBC AUTO-ENTMCNC: 33 G/DL (ref 33–37)
MCV RBC AUTO: 81.5 FL (ref 80–94)
MONOCYTES # BLD AUTO: 0.6 K/UL (ref 0.8–1)
MONOCYTES NFR BLD AUTO: 9.3 % (ref 1.7–9.3)
MUCOUS THREADS URNS QL MICRO: (no result) /LPF
NEUTROPHILS # BLD AUTO: 3.6 K/UL (ref 1.8–7.7)
NEUTROPHILS NFR BLD AUTO: 54.8 % (ref 42.2–75.2)
NITRITE UR QL STRIP: POSITIVE
PH UR STRIP: 6 [PH] (ref 5–9)
PLATELET # BLD AUTO: 234 K/UL (ref 140–450)
POTASSIUM SERPL-SCNC: 4.6 MMOL/L (ref 3.5–5.1)
PROT SERPL-MCNC: 7.4 G/DL (ref 6.4–8.2)
PROT UR QL STRIP: NEGATIVE
PROTHROMBIN TIME: 9.4 SECS (ref 10.8–13.4)
RBC # BLD AUTO: 5.54 MIL/UL (ref 4.2–5.4)
RBC #/AREA URNS HPF: (no result) /HPF (ref 0–5)
SODIUM SERPL-SCNC: 137 MMOL/L (ref 136–145)
SP GR UR STRIP: >=1.03 (ref 1–1.03)
SQUAMOUS URNS QL MICRO: (no result) /LPF
UROBILINOGEN UR STRIP-MCNC: 0.2 EU/DL (ref 0.2–1)
WBC # BLD AUTO: 6.6 K/UL (ref 4.8–10.8)
WBC,URINE: (no result) /HPF (ref 0–5)

## 2023-09-24 PROCEDURE — 86900 BLOOD TYPING SEROLOGIC ABO: CPT

## 2023-09-24 PROCEDURE — 96374 THER/PROPH/DIAG INJ IV PUSH: CPT

## 2023-09-24 PROCEDURE — 82948 REAGENT STRIP/BLOOD GLUCOSE: CPT

## 2023-09-24 PROCEDURE — G0378 HOSPITAL OBSERVATION PER HR: HCPCS

## 2023-09-24 PROCEDURE — 85730 THROMBOPLASTIN TIME PARTIAL: CPT

## 2023-09-24 PROCEDURE — 86886 COOMBS TEST INDIRECT TITER: CPT

## 2023-09-24 PROCEDURE — 71045 X-RAY EXAM CHEST 1 VIEW: CPT

## 2023-09-24 PROCEDURE — 80053 COMPREHEN METABOLIC PANEL: CPT

## 2023-09-24 PROCEDURE — 86901 BLOOD TYPING SEROLOGIC RH(D): CPT

## 2023-09-24 PROCEDURE — 99291 CRITICAL CARE FIRST HOUR: CPT

## 2023-09-24 PROCEDURE — 81001 URINALYSIS AUTO W/SCOPE: CPT

## 2023-09-24 PROCEDURE — 84484 ASSAY OF TROPONIN QUANT: CPT

## 2023-09-24 PROCEDURE — 87086 URINE CULTURE/COLONY COUNT: CPT

## 2023-09-24 PROCEDURE — 70450 CT HEAD/BRAIN W/O DYE: CPT

## 2023-09-24 PROCEDURE — 85025 COMPLETE CBC W/AUTO DIFF WBC: CPT

## 2023-09-24 PROCEDURE — 96376 TX/PRO/DX INJ SAME DRUG ADON: CPT

## 2023-09-24 PROCEDURE — 36415 COLL VENOUS BLD VENIPUNCTURE: CPT

## 2023-09-24 PROCEDURE — 72170 X-RAY EXAM OF PELVIS: CPT

## 2023-09-24 PROCEDURE — 85610 PROTHROMBIN TIME: CPT

## 2023-09-24 PROCEDURE — 96375 TX/PRO/DX INJ NEW DRUG ADDON: CPT
